# Patient Record
Sex: FEMALE | Race: WHITE | NOT HISPANIC OR LATINO | Employment: UNEMPLOYED | URBAN - NONMETROPOLITAN AREA
[De-identification: names, ages, dates, MRNs, and addresses within clinical notes are randomized per-mention and may not be internally consistent; named-entity substitution may affect disease eponyms.]

---

## 2017-03-21 ENCOUNTER — TRANSCRIBE ORDERS (OUTPATIENT)
Dept: ADMINISTRATIVE | Facility: HOSPITAL | Age: 67
End: 2017-03-21

## 2017-03-21 DIAGNOSIS — M89.8X9 BONE PAIN: ICD-10-CM

## 2017-03-21 DIAGNOSIS — Z12.31 VISIT FOR SCREENING MAMMOGRAM: ICD-10-CM

## 2017-03-21 DIAGNOSIS — Z13.820 SCREENING FOR OSTEOPOROSIS: Primary | ICD-10-CM

## 2017-03-21 DIAGNOSIS — Z85.3 HISTORY OF BREAST CANCER: ICD-10-CM

## 2017-03-28 ENCOUNTER — HOSPITAL ENCOUNTER (OUTPATIENT)
Dept: NUCLEAR MEDICINE | Facility: HOSPITAL | Age: 67
Discharge: HOME | End: 2017-03-28
Attending: INTERNAL MEDICINE

## 2017-03-28 ENCOUNTER — HOSPITAL ENCOUNTER (OUTPATIENT)
Dept: MAMMOGRAPHY | Facility: HOSPITAL | Age: 67
Discharge: HOME | End: 2017-03-28
Attending: INTERNAL MEDICINE | Admitting: INTERNAL MEDICINE

## 2017-03-28 DIAGNOSIS — M89.8X9 BONE PAIN: ICD-10-CM

## 2017-03-28 DIAGNOSIS — Z85.3 HISTORY OF BREAST CANCER: ICD-10-CM

## 2017-03-28 DIAGNOSIS — Z12.31 VISIT FOR SCREENING MAMMOGRAM: ICD-10-CM

## 2017-03-28 PROCEDURE — 78306 BONE IMAGING WHOLE BODY: CPT | Performed by: RADIOLOGY

## 2017-03-28 PROCEDURE — 0 TECHNETIUM OXIDRONATE KIT: Performed by: INTERNAL MEDICINE

## 2017-03-28 PROCEDURE — 77063 BREAST TOMOSYNTHESIS BI: CPT | Performed by: RADIOLOGY

## 2017-03-28 PROCEDURE — G0202 SCR MAMMO BI INCL CAD: HCPCS | Performed by: RADIOLOGY

## 2017-03-28 PROCEDURE — 78306 BONE IMAGING WHOLE BODY: CPT

## 2017-03-28 PROCEDURE — A9561 TC99M OXIDRONATE: HCPCS | Performed by: INTERNAL MEDICINE

## 2017-03-28 PROCEDURE — 77063 BREAST TOMOSYNTHESIS BI: CPT

## 2017-03-28 PROCEDURE — G0202 SCR MAMMO BI INCL CAD: HCPCS

## 2017-03-28 RX ADMIN — TECHNETIUM TC 99M OXIDRONATE 1 DOSE: 3.15 INJECTION, POWDER, LYOPHILIZED, FOR SOLUTION INTRAVENOUS at 10:00

## 2017-09-07 ENCOUNTER — TRANSCRIBE ORDERS (OUTPATIENT)
Dept: ADMINISTRATIVE | Facility: HOSPITAL | Age: 67
End: 2017-09-07

## 2017-09-07 DIAGNOSIS — R10.84 ABDOMINAL PAIN, GENERALIZED: Primary | ICD-10-CM

## 2017-10-16 ENCOUNTER — HOSPITAL ENCOUNTER (OUTPATIENT)
Dept: GENERAL RADIOLOGY | Facility: HOSPITAL | Age: 67
Discharge: HOME | End: 2017-10-16
Attending: INTERNAL MEDICINE

## 2017-10-16 ENCOUNTER — HOSPITAL ENCOUNTER (OUTPATIENT)
Dept: ULTRASOUND IMAGING | Facility: HOSPITAL | Age: 67
Discharge: HOME | End: 2017-10-16
Attending: INTERNAL MEDICINE | Admitting: INTERNAL MEDICINE

## 2017-10-16 ENCOUNTER — TRANSCRIBE ORDERS (OUTPATIENT)
Dept: LAB | Facility: HOSPITAL | Age: 67
End: 2017-10-16

## 2017-10-16 DIAGNOSIS — R10.84 ABDOMINAL PAIN, GENERALIZED: ICD-10-CM

## 2017-10-16 DIAGNOSIS — R06.02 SOB (SHORTNESS OF BREATH): Primary | ICD-10-CM

## 2017-10-16 DIAGNOSIS — R07.9 CHEST PAIN, UNSPECIFIED TYPE: ICD-10-CM

## 2017-10-16 DIAGNOSIS — R06.02 SOB (SHORTNESS OF BREATH): ICD-10-CM

## 2017-10-16 PROCEDURE — 76700 US EXAM ABDOM COMPLETE: CPT

## 2017-10-16 PROCEDURE — 76700 US EXAM ABDOM COMPLETE: CPT | Performed by: RADIOLOGY

## 2017-10-16 PROCEDURE — 71020 HC CHEST PA AND LATERAL: CPT

## 2017-10-16 PROCEDURE — 71020 XR CHEST PA AND LATERAL: CPT | Performed by: RADIOLOGY

## 2019-09-18 ENCOUNTER — HOSPITAL ENCOUNTER (OUTPATIENT)
Dept: MAMMOGRAPHY | Facility: HOSPITAL | Age: 69
Discharge: HOME | End: 2019-09-18
Admitting: INTERNAL MEDICINE

## 2019-09-18 DIAGNOSIS — Z12.39 SCREENING BREAST EXAMINATION: ICD-10-CM

## 2019-09-18 PROCEDURE — 77067 SCR MAMMO BI INCL CAD: CPT | Performed by: RADIOLOGY

## 2019-09-18 PROCEDURE — 77067 SCR MAMMO BI INCL CAD: CPT

## 2019-09-18 PROCEDURE — 77063 BREAST TOMOSYNTHESIS BI: CPT | Performed by: RADIOLOGY

## 2019-09-18 PROCEDURE — 77063 BREAST TOMOSYNTHESIS BI: CPT

## 2019-10-02 ENCOUNTER — HOSPITAL ENCOUNTER (OUTPATIENT)
Dept: ULTRASOUND IMAGING | Facility: HOSPITAL | Age: 69
Discharge: HOME | End: 2019-10-02
Admitting: RADIOLOGY

## 2019-10-02 DIAGNOSIS — R92.8 ABNORMAL MAMMOGRAM OF LEFT BREAST: ICD-10-CM

## 2019-10-02 PROCEDURE — 76642 ULTRASOUND BREAST LIMITED: CPT

## 2019-10-02 PROCEDURE — 76642 ULTRASOUND BREAST LIMITED: CPT | Performed by: RADIOLOGY

## 2019-10-15 ENCOUNTER — HOSPITAL ENCOUNTER (OUTPATIENT)
Dept: ULTRASOUND IMAGING | Facility: HOSPITAL | Age: 69
Discharge: HOME | End: 2019-10-15
Admitting: RADIOLOGY

## 2019-10-15 ENCOUNTER — HOSPITAL ENCOUNTER (OUTPATIENT)
Dept: MAMMOGRAPHY | Facility: HOSPITAL | Age: 69
Discharge: HOME | End: 2019-10-15

## 2019-10-15 DIAGNOSIS — R92.8 ABNORMAL MAMMOGRAM: ICD-10-CM

## 2019-10-15 PROCEDURE — 88305 TISSUE EXAM BY PATHOLOGIST: CPT | Performed by: RADIOLOGY

## 2019-10-15 PROCEDURE — 77065 DX MAMMO INCL CAD UNI: CPT | Performed by: RADIOLOGY

## 2019-10-15 PROCEDURE — A4648 IMPLANTABLE TISSUE MARKER: HCPCS

## 2019-10-15 PROCEDURE — 19083 BX BREAST 1ST LESION US IMAG: CPT | Performed by: RADIOLOGY

## 2019-10-17 LAB
LAB AP CASE REPORT: NORMAL
PATH REPORT.FINAL DX SPEC: NORMAL

## 2019-10-18 ENCOUNTER — TELEPHONE (OUTPATIENT)
Dept: MAMMOGRAPHY | Facility: HOSPITAL | Age: 69
End: 2019-10-18

## 2019-10-18 NOTE — TELEPHONE ENCOUNTER
----- Message from Marisol Camilo MD sent at 10/17/2019 11:55 AM EDT -----  PATHOLOGY:    Proliferative fibrocystic changes including epithelial hyperplasia without atypia, stromal fibrosis, apocrine metaplasia, and focal columnar cell change. Macrocalcifications are identified. No evidence of atypia or malignancy.    The pathology result is concordant with the imaging findings. Recommend 6 month follow-up diagnostic left mammogram.    The patient will be notified of the results and recommendations by our breast care nurse.

## 2020-10-27 ENCOUNTER — HOSPITAL ENCOUNTER (OUTPATIENT)
Dept: MAMMOGRAPHY | Facility: HOSPITAL | Age: 70
Discharge: HOME | End: 2020-10-27

## 2020-10-27 ENCOUNTER — HOSPITAL ENCOUNTER (OUTPATIENT)
Dept: ULTRASOUND IMAGING | Facility: HOSPITAL | Age: 70
Discharge: HOME | End: 2020-10-27

## 2020-10-27 DIAGNOSIS — R92.8 ABNORMAL MAMMOGRAM: ICD-10-CM

## 2020-10-27 PROCEDURE — G0279 TOMOSYNTHESIS, MAMMO: HCPCS | Performed by: RADIOLOGY

## 2020-10-27 PROCEDURE — 77065 DX MAMMO INCL CAD UNI: CPT

## 2020-10-27 PROCEDURE — 77065 DX MAMMO INCL CAD UNI: CPT | Performed by: RADIOLOGY

## 2020-10-27 PROCEDURE — G0279 TOMOSYNTHESIS, MAMMO: HCPCS

## 2020-10-27 PROCEDURE — 76642 ULTRASOUND BREAST LIMITED: CPT | Performed by: RADIOLOGY

## 2020-10-27 PROCEDURE — 76642 ULTRASOUND BREAST LIMITED: CPT

## 2021-01-13 ENCOUNTER — IMMUNIZATION (OUTPATIENT)
Dept: VACCINE CLINIC | Facility: HOSPITAL | Age: 71
End: 2021-01-13

## 2021-01-13 PROCEDURE — 91300 HC SARSCOV02 VAC 30MCG/0.3ML IM: CPT | Performed by: FAMILY MEDICINE

## 2021-01-13 PROCEDURE — 0001A: CPT | Performed by: FAMILY MEDICINE

## 2021-02-03 ENCOUNTER — IMMUNIZATION (OUTPATIENT)
Dept: VACCINE CLINIC | Facility: HOSPITAL | Age: 71
End: 2021-02-03

## 2021-02-03 PROCEDURE — 91300 HC SARSCOV02 VAC 30MCG/0.3ML IM: CPT | Performed by: INTERNAL MEDICINE

## 2021-02-03 PROCEDURE — 0002A: CPT | Performed by: INTERNAL MEDICINE

## 2021-05-26 ENCOUNTER — HOSPITAL ENCOUNTER (OUTPATIENT)
Dept: MAMMOGRAPHY | Facility: HOSPITAL | Age: 71
Discharge: HOME | End: 2021-05-26
Admitting: INTERNAL MEDICINE

## 2021-05-26 DIAGNOSIS — R92.8 ABNORMAL MAMMOGRAM: ICD-10-CM

## 2021-05-26 PROCEDURE — 77065 DX MAMMO INCL CAD UNI: CPT

## 2021-05-26 PROCEDURE — 77065 DX MAMMO INCL CAD UNI: CPT | Performed by: RADIOLOGY

## 2021-05-26 PROCEDURE — G0279 TOMOSYNTHESIS, MAMMO: HCPCS | Performed by: RADIOLOGY

## 2021-05-26 PROCEDURE — G0279 TOMOSYNTHESIS, MAMMO: HCPCS

## 2021-07-07 ENCOUNTER — HOSPITAL ENCOUNTER (OUTPATIENT)
Dept: GENERAL RADIOLOGY | Facility: HOSPITAL | Age: 71
Discharge: HOME | End: 2021-07-07
Admitting: NURSE PRACTITIONER

## 2021-07-07 ENCOUNTER — TRANSCRIBE ORDERS (OUTPATIENT)
Dept: ADMINISTRATIVE | Facility: HOSPITAL | Age: 71
End: 2021-07-07

## 2021-07-07 DIAGNOSIS — M54.50 LOW BACK PAIN, UNSPECIFIED BACK PAIN LATERALITY, UNSPECIFIED CHRONICITY, UNSPECIFIED WHETHER SCIATICA PRESENT: Primary | ICD-10-CM

## 2021-07-07 PROCEDURE — 72110 X-RAY EXAM L-2 SPINE 4/>VWS: CPT

## 2021-07-07 PROCEDURE — 72110 X-RAY EXAM L-2 SPINE 4/>VWS: CPT | Performed by: RADIOLOGY

## 2021-08-26 ENCOUNTER — HOSPITAL ENCOUNTER (OUTPATIENT)
Dept: GENERAL RADIOLOGY | Facility: HOSPITAL | Age: 71
Discharge: HOME | End: 2021-08-26
Admitting: NURSE PRACTITIONER

## 2021-08-26 ENCOUNTER — TRANSCRIBE ORDERS (OUTPATIENT)
Dept: ADMINISTRATIVE | Facility: HOSPITAL | Age: 71
End: 2021-08-26

## 2021-08-26 DIAGNOSIS — R07.81 RIB PAIN ON RIGHT SIDE: ICD-10-CM

## 2021-08-26 DIAGNOSIS — R07.9 CHEST PAIN, UNSPECIFIED TYPE: Primary | ICD-10-CM

## 2021-08-26 DIAGNOSIS — R07.9 CHEST PAIN, UNSPECIFIED TYPE: ICD-10-CM

## 2021-08-26 PROCEDURE — 71046 X-RAY EXAM CHEST 2 VIEWS: CPT | Performed by: RADIOLOGY

## 2021-08-26 PROCEDURE — 71046 X-RAY EXAM CHEST 2 VIEWS: CPT

## 2021-11-23 ENCOUNTER — APPOINTMENT (OUTPATIENT)
Dept: MAMMOGRAPHY | Facility: HOSPITAL | Age: 71
End: 2021-11-23

## 2022-02-24 ENCOUNTER — HOSPITAL ENCOUNTER (OUTPATIENT)
Dept: MAMMOGRAPHY | Facility: HOSPITAL | Age: 72
Discharge: HOME | End: 2022-02-24
Admitting: RADIOLOGY

## 2022-02-24 DIAGNOSIS — R92.8 ABNORMAL MAMMOGRAM: ICD-10-CM

## 2022-02-24 PROCEDURE — G0279 TOMOSYNTHESIS, MAMMO: HCPCS

## 2022-02-24 PROCEDURE — 77065 DX MAMMO INCL CAD UNI: CPT

## 2022-02-24 PROCEDURE — G0279 TOMOSYNTHESIS, MAMMO: HCPCS | Performed by: RADIOLOGY

## 2022-02-24 PROCEDURE — 77065 DX MAMMO INCL CAD UNI: CPT | Performed by: RADIOLOGY

## 2023-03-02 ENCOUNTER — HOSPITAL ENCOUNTER (OUTPATIENT)
Dept: MAMMOGRAPHY | Facility: HOSPITAL | Age: 73
Discharge: HOME | End: 2023-03-02
Admitting: INTERNAL MEDICINE
Payer: MEDICARE

## 2023-03-02 DIAGNOSIS — R92.8 ABNORMAL MAMMOGRAM: ICD-10-CM

## 2023-03-02 PROCEDURE — 77065 DX MAMMO INCL CAD UNI: CPT | Performed by: RADIOLOGY

## 2023-03-02 PROCEDURE — 77065 DX MAMMO INCL CAD UNI: CPT

## 2023-03-02 PROCEDURE — G0279 TOMOSYNTHESIS, MAMMO: HCPCS | Performed by: RADIOLOGY

## 2023-03-02 PROCEDURE — G0279 TOMOSYNTHESIS, MAMMO: HCPCS

## 2023-05-19 ENCOUNTER — TRANSCRIBE ORDERS (OUTPATIENT)
Dept: ADMINISTRATIVE | Facility: HOSPITAL | Age: 73
End: 2023-05-19
Payer: MEDICARE

## 2023-05-19 DIAGNOSIS — Z78.0 MENOPAUSE: ICD-10-CM

## 2023-05-19 DIAGNOSIS — Z85.3 PERSONAL HISTORY OF MALIGNANT NEOPLASM OF BREAST: Primary | ICD-10-CM

## 2023-05-22 ENCOUNTER — TRANSCRIBE ORDERS (OUTPATIENT)
Dept: ADMINISTRATIVE | Facility: HOSPITAL | Age: 73
End: 2023-05-22
Payer: MEDICARE

## 2023-05-22 DIAGNOSIS — Z85.3 PERSONAL HISTORY OF BREAST CANCER: Primary | ICD-10-CM

## 2023-05-22 DIAGNOSIS — Z78.0 ASYMPTOMATIC MENOPAUSAL STATE: ICD-10-CM

## 2023-06-05 ENCOUNTER — HOSPITAL ENCOUNTER (OUTPATIENT)
Dept: BONE DENSITY | Facility: HOSPITAL | Age: 73
Discharge: HOME | End: 2023-06-05
Payer: MEDICARE

## 2023-06-05 DIAGNOSIS — Z78.0 MENOPAUSE: ICD-10-CM

## 2023-06-05 DIAGNOSIS — Z85.3 PERSONAL HISTORY OF MALIGNANT NEOPLASM OF BREAST: ICD-10-CM

## 2023-06-05 PROCEDURE — 77080 DXA BONE DENSITY AXIAL: CPT

## 2023-07-06 PROBLEM — I48.91 ATRIAL FIBRILLATION WITH RVR: Status: ACTIVE | Noted: 2023-07-06

## 2023-07-11 PROBLEM — I48.0 PAROXYSMAL ATRIAL FIBRILLATION: Status: ACTIVE | Noted: 2023-07-11

## 2023-07-11 PROBLEM — I15.0 RENOVASCULAR HYPERTENSION: Status: ACTIVE | Noted: 2023-07-11

## 2023-07-11 PROBLEM — E03.9 HYPOTHYROIDISM (ACQUIRED): Status: ACTIVE | Noted: 2023-07-11

## 2023-08-18 RX ORDER — RANOLAZINE 500 MG/1
500 TABLET, EXTENDED RELEASE ORAL EVERY 12 HOURS SCHEDULED
Qty: 60 TABLET | Refills: 3 | Status: SHIPPED | OUTPATIENT
Start: 2023-08-18

## 2023-08-18 NOTE — TELEPHONE ENCOUNTER
Caller: Cisco Luna HARRY    Relationship: Self    Best call back number: [phone]    Requested Prescriptions:   Requested Prescriptions     Pending Prescriptions Disp Refills    ranolazine (RANEXA) 500 MG 12 hr tablet 60 tablet 0     Sig: Take 1 tablet by mouth Every 12 (Twelve) Hours for 30 days.        Pharmacy where request should be sent: [place], [place] - [address] [facility] - [phone]  - [phone]     Last office visit with prescribing clinician: 7/11/2023   Last telemedicine visit with prescribing clinician: Visit date not found   Next office visit with prescribing clinician: 11/9/2023       Does the patient have less than a 3 day supply:  [] Yes  [x] No    Would you like a call back once the refill request has been completed: [] Yes [x] No    If the office needs to give you a call back, can they leave a voicemail: [] Yes [x] No    Bunny Garcia Rep   08/18/23 09:55 EDT

## 2023-11-09 ENCOUNTER — OFFICE VISIT (OUTPATIENT)
Dept: CARDIOLOGY | Facility: CLINIC | Age: 73
End: 2023-11-09
Payer: MEDICARE

## 2023-11-09 VITALS
SYSTOLIC BLOOD PRESSURE: 178 MMHG | OXYGEN SATURATION: 98 % | BODY MASS INDEX: 27.64 KG/M2 | WEIGHT: 172 LBS | HEIGHT: 66 IN | HEART RATE: 44 BPM | DIASTOLIC BLOOD PRESSURE: 82 MMHG

## 2023-11-09 DIAGNOSIS — I48.0 PAROXYSMAL ATRIAL FIBRILLATION: Primary | ICD-10-CM

## 2023-11-09 DIAGNOSIS — E03.9 HYPOTHYROIDISM (ACQUIRED): ICD-10-CM

## 2023-11-09 DIAGNOSIS — I15.0 RENOVASCULAR HYPERTENSION: ICD-10-CM

## 2023-11-09 PROCEDURE — 99212 OFFICE O/P EST SF 10 MIN: CPT | Performed by: INTERNAL MEDICINE

## 2023-11-09 PROCEDURE — 93000 ELECTROCARDIOGRAM COMPLETE: CPT | Performed by: INTERNAL MEDICINE

## 2023-11-09 RX ORDER — HYDROCHLOROTHIAZIDE 25 MG/1
25 TABLET ORAL DAILY
Qty: 30 TABLET | Refills: 11 | Status: SHIPPED | OUTPATIENT
Start: 2023-11-09

## 2023-11-09 NOTE — PROGRESS NOTES
Office Note    Subjective     Luna Peck is a 73 y.o. female who presents to day for follow-up      Active Problems:  Problem List Items Addressed This Visit          Cardiac and Vasculature    Paroxysmal atrial fibrillation - Primary    Renovascular hypertension    Relevant Medications    hydroCHLOROthiazide (HYDRODIURIL) 25 MG tablet       Endocrine and Metabolic    Hypothyroidism (acquired)       HPI  Patient is a pleasant 72-year-old female past medical history significant for hypertension, hypothyroidism, gastroesophageal reflux disease came in with complaints with A-fib with fast ventricular sponsor on July 6.  Patient had ischemic evaluation done with a stress test done on July 8, 2023 showed normal EF and negative for ischemia and echo which were normal.  Patient is currently in sinus rhythm.  Patient is on anticoagulation also.     Patient blood pressure is high today.  She had not taken her blood pressure medication today.  Has had no chest pains or breathing problems.  Patient has some mild swelling in the left leg and with some tingling sensation at times.    PRIOR MEDS  Current Outpatient Medications on File Prior to Visit   Medication Sig Dispense Refill    alendronate (FOSAMAX) 70 MG tablet Take 1 tablet by mouth Every 7 (Seven) Days.      bumetanide (BUMEX) 1 MG tablet Take 1 tablet by mouth Daily As Needed (swelling).      cetirizine (zyrTEC) 10 MG tablet Take 1 tablet by mouth Every Night.      Diclofenac Sodium (VOLTAREN) 1 % gel gel Apply 2 g topically to the appropriate area as directed 4 (Four) Times a Day As Needed.      HYDROcodone-acetaminophen (NORCO) 7.5-325 MG per tablet Take 1 tablet by mouth Daily As Needed for Moderate Pain.      levothyroxine (SYNTHROID, LEVOTHROID) 137 MCG tablet Take 1 tablet by mouth Daily.      omeprazole (priLOSEC) 20 MG capsule Take 1 capsule by mouth 2 (Two) Times a Day.      potassium chloride 10 MEQ CR tablet Take 1 tablet by mouth Daily.       ranolazine (RANEXA) 500 MG 12 hr tablet Take 1 tablet by mouth Every 12 (Twelve) Hours. 60 tablet 3    vitamin D (ERGOCALCIFEROL) 1.25 MG (30301 UT) capsule capsule Take 1 capsule by mouth 1 (One) Time Per Week. Takes Sundays      [DISCONTINUED] dilTIAZem CD (CARDIZEM CD) 120 MG 24 hr capsule Take 1 capsule by mouth Daily. 90 capsule 3     No current facility-administered medications on file prior to visit.       ALLERGIES  Patient has no known allergies.    HISTORY  Past Medical History:   Diagnosis Date    Arthritis     Breast cancer 2006    right    Disease of thyroid gland     GERD (gastroesophageal reflux disease)     Hypertension     Injury of back     Migraine     Osteoporosis        Social History     Socioeconomic History    Marital status:    Tobacco Use    Smoking status: Never     Passive exposure: Past    Smokeless tobacco: Never   Vaping Use    Vaping Use: Never used   Substance and Sexual Activity    Alcohol use: Yes     Alcohol/week: 3.0 standard drinks of alcohol     Types: 3 Shots of liquor per week    Drug use: Never    Sexual activity: Defer       Family History   Problem Relation Age of Onset    Breast cancer Neg Hx        Review of Systems   Constitutional:  Negative for chills, diaphoresis and fatigue.   HENT:  Negative for ear discharge and tinnitus.    Eyes:  Negative for redness and visual disturbance.   Respiratory: Negative.  Negative for apnea, cough, choking, chest tightness, shortness of breath, wheezing and stridor.    Cardiovascular: Negative.  Negative for palpitations and leg swelling.   Gastrointestinal: Negative.  Negative for abdominal distention, abdominal pain, constipation, diarrhea, nausea and vomiting.   Endocrine: Negative for cold intolerance, heat intolerance, polydipsia, polyphagia and polyuria.   Genitourinary:  Negative for difficulty urinating, flank pain, frequency, hematuria and urgency.   Musculoskeletal: Negative.  Negative for arthralgias, back pain,  "gait problem, joint swelling, myalgias and neck pain.   Skin:  Negative for pallor and rash.   Neurological:  Positive for numbness. Negative for dizziness, speech difficulty, light-headedness and headaches.   Hematological:  Does not bruise/bleed easily.   Psychiatric/Behavioral: Negative.  Negative for agitation, hallucinations, self-injury, sleep disturbance and suicidal ideas. The patient is not nervous/anxious.        Objective     VITALS: /82 (BP Location: Left arm, Patient Position: Sitting, Cuff Size: Adult)   Pulse (!) 44   Ht 167.6 cm (66\")   Wt 78 kg (172 lb)   SpO2 98%   BMI 27.76 kg/m²     LABS:   No visits with results within 3 Month(s) from this visit.   Latest known visit with results is:   No results displayed because visit has over 200 results.            IMAGING:   No Images in the past 120 days found..  Results for orders placed during the hospital encounter of 07/06/23    Stress Test With Myocardial Perfusion One Day    Interpretation Summary    Left ventricular ejection fraction is normal.    Myocardial perfusion imaging indicates a normal myocardial perfusion study with no evidence of ischemia.    Impressions are consistent with a low risk study.    TID 0.91.    Findings consistent with a normal ECG stress test.     No results found for this or any previous visit.          EXAM:  Constitutional:       General: Awake.      Appearance: Healthy appearance. Not in distress.   Eyes:      Conjunctiva/sclera: Conjunctivae normal.   HENT:      Head: Normocephalic and atraumatic.      Nose: Nose normal.    Mouth/Throat:      Pharynx: Oropharynx is clear.   Neck:      Thyroid: Thyroid normal.      Vascular: No carotid bruit or JVD.   Pulmonary:      Effort: Pulmonary effort is normal.      Breath sounds: Normal breath sounds.   Chest:      Chest wall: Not tender to palpatation.   Cardiovascular:      PMI at left midclavicular line. Normal rate. Regular rhythm. Normal S1 with normal intensity. " Normal S2 with normal intensity.       Murmurs: There is no murmur.      No gallop.  No rub.   Pulses:     Intact distal pulses.   Edema:     Peripheral edema absent.   Abdominal:      General: Bowel sounds are normal. There is no distension.      Palpations: Abdomen is soft. There is no hepatomegaly.      Tenderness: There is no abdominal tenderness.   Musculoskeletal: Normal range of motion.      Cervical back: Normal range of motion. Skin:     General: Skin is warm and dry. There is no cyanosis.      Coloration: Skin is not jaundiced.   Neurological:      Mental Status: Alert and oriented to person, place and time.      Motor: Motor function is intact.      Gait: Gait is intact.   Psychiatric:         Attention and Perception: Attention and perception normal.         Speech: Speech normal.         Behavior: Behavior is cooperative.         Cognition and Memory: Cognition and memory normal.         Judgment: Judgment normal.          Procedure     ECG 12 Lead    Date/Time: 11/9/2023 2:48 PM  Performed by: Nahum Deutsch MD    Authorized by: Nahum Deutsch MD  Comparison: compared with previous ECG from 7/8/2023  Similar to previous ECG  Comments: Okay yeah I think I was not here when he left excuse me is a little difficult EKG showed sinus rhythm at 44, left axis, right bundle branch block             Assessment & Plan     Diagnoses and all orders for this visit:    1. Paroxysmal atrial fibrillation (Primary)    2. Renovascular hypertension    3. Hypothyroidism (acquired)    Other orders  -     ECG 12 Lead  -     hydroCHLOROthiazide (HYDRODIURIL) 25 MG tablet; Take 1 tablet by mouth Daily.  Dispense: 30 tablet; Refill: 11          PLAN  #1 cardiac.  Patient with history of proximal atrial fibrillation.  Patient heart rate is noted to be in 40s.  Will get EKG done.  We will stop her Cardizem.  May put 7-day event monitor.  2. Hypertension.  Patient blood pressure is running on the high side.  She has not taken her  medication today.  Will start her on hydrochlorothiazide.  May have to add angiotensin receptor blocker.  Systolic pressure less than 139 diastolic less than 89 will be advised.  We will have her check her blood pressure daily for a week and call us back           MEDS ORDERED DURING VISIT:    Medications Discontinued During This Encounter   Medication Reason    dilTIAZem CD (CARDIZEM CD) 120 MG 24 hr capsule *Therapy completed        New Medications Ordered This Visit   Medications    hydroCHLOROthiazide (HYDRODIURIL) 25 MG tablet     Sig: Take 1 tablet by mouth Daily.     Dispense:  30 tablet     Refill:  11         Follow Up:   Return in about 4 weeks (around 12/7/2023) for Recheck.    Patient was given instructions and counseling regarding her condition or for health maintenance advice. Please see specific information pulled into the AVS if appropriate.   As always, Tamara Ramirez APRN  I appreciate very much the opportunity to participate in the cardiovascular care of your patients. Please do not hesitate to call me with any questions with regards to Luna Peck evaluation and management.         This document has been electronically signed by Nahum Deutsch MD [facility], Interventional Cardiology  November 9, 2023 14:51 EST    Dictated Utilizing Dragon Dictation: Part of this note may be an electronic transcription/translation of spoken language to printed text using the Dragon Dictation System.

## 2023-12-07 ENCOUNTER — OFFICE VISIT (OUTPATIENT)
Dept: CARDIOLOGY | Facility: CLINIC | Age: 73
End: 2023-12-07
Payer: MEDICARE

## 2023-12-07 VITALS
WEIGHT: 166.6 LBS | HEART RATE: 64 BPM | HEIGHT: 66 IN | BODY MASS INDEX: 26.78 KG/M2 | SYSTOLIC BLOOD PRESSURE: 124 MMHG | OXYGEN SATURATION: 96 % | DIASTOLIC BLOOD PRESSURE: 75 MMHG

## 2023-12-07 DIAGNOSIS — E03.9 HYPOTHYROIDISM (ACQUIRED): ICD-10-CM

## 2023-12-07 DIAGNOSIS — I48.0 PAROXYSMAL ATRIAL FIBRILLATION: Primary | ICD-10-CM

## 2023-12-07 DIAGNOSIS — I15.0 RENOVASCULAR HYPERTENSION: ICD-10-CM

## 2023-12-07 PROCEDURE — 1159F MED LIST DOCD IN RCRD: CPT | Performed by: INTERNAL MEDICINE

## 2023-12-07 PROCEDURE — 1160F RVW MEDS BY RX/DR IN RCRD: CPT | Performed by: INTERNAL MEDICINE

## 2023-12-07 PROCEDURE — 99212 OFFICE O/P EST SF 10 MIN: CPT | Performed by: INTERNAL MEDICINE

## 2023-12-07 NOTE — PROGRESS NOTES
Office Note    Subjective     Luna Peck is a 73 y.o. female who presents to day for routine follow-up      Active Problems:  Problem List Items Addressed This Visit          Cardiac and Vasculature    Paroxysmal atrial fibrillation - Primary    Renovascular hypertension       Endocrine and Metabolic    Hypothyroidism (acquired)       HPI  Patient is a pleasant 72-year-old female past medical history significant for hypertension, hypothyroidism, gastroesophageal reflux disease came in with complaints with A-fib with fast ventricular sponsor on July 6.  Patient had ischemic evaluation done with a stress and echo which were normal.     Patient had event monitor placed in November 2023.  On evaluation of the event monitor heart rate was noted to be as low as 38-45 range while she was sleeping.  No heart blocks noted.    She has had mild lightheadedness but no blackouts.  Denies any chest pains.    PRIOR MEDS  Current Outpatient Medications on File Prior to Visit   Medication Sig Dispense Refill    alendronate (FOSAMAX) 70 MG tablet Take 1 tablet by mouth Every 7 (Seven) Days.      bumetanide (BUMEX) 1 MG tablet Take 1 tablet by mouth Daily As Needed (swelling).      cetirizine (zyrTEC) 10 MG tablet Take 1 tablet by mouth Every Night.      Diclofenac Sodium (VOLTAREN) 1 % gel gel Apply 2 g topically to the appropriate area as directed 4 (Four) Times a Day As Needed.      hydroCHLOROthiazide (HYDRODIURIL) 25 MG tablet Take 1 tablet by mouth Daily. 30 tablet 11    HYDROcodone-acetaminophen (NORCO) 7.5-325 MG per tablet Take 1 tablet by mouth Daily As Needed for Moderate Pain.      levothyroxine (SYNTHROID, LEVOTHROID) 137 MCG tablet Take 1 tablet by mouth Daily.      omeprazole (priLOSEC) 20 MG capsule Take 1 capsule by mouth 2 (Two) Times a Day.      potassium chloride 10 MEQ CR tablet Take 1 tablet by mouth Daily.      vitamin D (ERGOCALCIFEROL) 1.25 MG (67944 UT) capsule capsule Take 1 capsule by mouth 1 (One)  "Time Per Week. Takes Sundays      [DISCONTINUED] ranolazine (RANEXA) 500 MG 12 hr tablet Take 1 tablet by mouth Every 12 (Twelve) Hours. 60 tablet 3     No current facility-administered medications on file prior to visit.       ALLERGIES  Patient has no known allergies.    HISTORY  Past Medical History:   Diagnosis Date    Arthritis     Breast cancer 2006    right    Disease of thyroid gland     GERD (gastroesophageal reflux disease)     Hypertension     Injury of back     Migraine     Osteoporosis        Social History     Socioeconomic History    Marital status:    Tobacco Use    Smoking status: Never     Passive exposure: Past    Smokeless tobacco: Never   Vaping Use    Vaping Use: Never used   Substance and Sexual Activity    Alcohol use: Yes     Alcohol/week: 3.0 standard drinks of alcohol     Types: 3 Shots of liquor per week    Drug use: Never    Sexual activity: Defer       Family History   Problem Relation Age of Onset    Breast cancer Neg Hx        Review of Systems   Respiratory:  Negative for chest tightness, shortness of breath and wheezing.    Cardiovascular:  Negative for chest pain and palpitations.   Neurological:  Positive for light-headedness. Negative for syncope.       Objective     VITALS: /75 (BP Location: Left arm, Patient Position: Sitting, Cuff Size: Adult)   Pulse 64   Ht 167.6 cm (66\")   Wt 75.6 kg (166 lb 9.6 oz)   SpO2 96%   BMI 26.89 kg/m²     LABS:   No visits with results within 3 Month(s) from this visit.   Latest known visit with results is:   No results displayed because visit has over 200 results.            IMAGING:   No Images in the past 120 days found..  Results for orders placed during the hospital encounter of 07/06/23    Stress Test With Myocardial Perfusion One Day    Interpretation Summary    Left ventricular ejection fraction is normal.    Myocardial perfusion imaging indicates a normal myocardial perfusion study with no evidence of ischemia.    " Impressions are consistent with a low risk study.    TID 0.91.    Findings consistent with a normal ECG stress test.     No results found for this or any previous visit.          EXAM:  Constitutional:       General: Awake.      Appearance: Healthy appearance. Not in distress.   Eyes:      Conjunctiva/sclera: Conjunctivae normal.   HENT:      Head: Normocephalic and atraumatic.      Nose: Nose normal.    Mouth/Throat:      Pharynx: Oropharynx is clear.   Neck:      Thyroid: Thyroid normal.      Vascular: No carotid bruit or JVD.   Pulmonary:      Effort: Pulmonary effort is normal.      Breath sounds: Normal breath sounds.   Chest:      Chest wall: Not tender to palpatation.   Cardiovascular:      PMI at left midclavicular line. Normal rate. Regular rhythm. Normal S1 with normal intensity. Normal S2 with normal intensity.       Murmurs: There is no murmur.      No gallop.  No rub.   Pulses:     Intact distal pulses.   Edema:     Peripheral edema absent.   Abdominal:      General: Bowel sounds are normal. There is no distension.      Palpations: Abdomen is soft. There is no hepatomegaly.      Tenderness: There is no abdominal tenderness.   Musculoskeletal: Normal range of motion.      Cervical back: Normal range of motion. Skin:     General: Skin is warm and dry. There is no cyanosis.      Coloration: Skin is not jaundiced.   Neurological:      Mental Status: Alert and oriented to person, place and time.      Motor: Motor function is intact.      Gait: Gait is intact.   Psychiatric:         Attention and Perception: Attention and perception normal.         Speech: Speech normal.         Behavior: Behavior is cooperative.         Cognition and Memory: Cognition and memory normal.         Judgment: Judgment normal.          Procedure   Procedures       Assessment & Plan     Diagnoses and all orders for this visit:    1. Paroxysmal atrial fibrillation (Primary)    2. Renovascular hypertension    3. Hypothyroidism  (acquired)    Other orders  -     apixaban (ELIQUIS) 5 MG tablet tablet; Take 1 tablet by mouth 2 (Two) Times a Day.  Dispense: 60 tablet; Refill: 12          PLAN  #1 cardiac.  Patient with history of paroxysmal atrial fibrillation.  On last visit in November her Cardizem was DC'd.  She mistakenly stopped her Eliquis also.  Will restart Eliquis 5 mg twice daily.  Patient wore event monitor which did not show any significant heart blocks although she did have heart rate slowed down and high 30s to low 40s while sleeping.  Patient has any symptoms of dizziness lightheadedness or blackouts, may have to consider putting a permanent pacemaker also.  Will watch clinically.  Will stop Ranexa due to potential bradycardia effect of same.           MEDS ORDERED DURING VISIT:    Medications Discontinued During This Encounter   Medication Reason    ranolazine (RANEXA) 500 MG 12 hr tablet *Therapy completed        New Medications Ordered This Visit   Medications    apixaban (ELIQUIS) 5 MG tablet tablet     Sig: Take 1 tablet by mouth 2 (Two) Times a Day.     Dispense:  60 tablet     Refill:  12         Follow Up:   Return in about 6 months (around 6/7/2024) for Recheck.    Patient was given instructions and counseling regarding her condition or for health maintenance advice. Please see specific information pulled into the AVS if appropriate.   As always, Tamara Ramirez APRN  I appreciate very much the opportunity to participate in the cardiovascular care of your patients. Please do not hesitate to call me with any questions with regards to Luna Peck evaluation and management.         This document has been electronically signed by Nahum Deutsch MD [facility], Interventional Cardiology  December 7, 2023 14:01 EST    Dictated Utilizing Dragon Dictation: Part of this note may be an electronic transcription/translation of spoken language to printed text using the Dragon Dictation System.

## 2024-01-17 ENCOUNTER — TELEPHONE (OUTPATIENT)
Dept: CARDIOLOGY | Facility: CLINIC | Age: 74
End: 2024-01-17
Payer: MEDICARE

## 2024-01-17 NOTE — TELEPHONE ENCOUNTER
Caller: Luna Peck    Relationship: Self    Best call back number: [phone]    Which medication are you concerned about: RANOLAZINE 500 MG ONE TABLET  2 TIMES DAILY    Who prescribed you this medication: DR. BENITO    When did you start taking this medication: FOR AWHILE    What are your concerns: PATIENT NEEDS A REFILL FOR RANOLAZINE 500 MG 1 TABLET 2 TIMES A DAY    PLEASE SEND PRESCRIPTION TO [facility] [place] [phone]    PATIENT IS OUT OF MEDICATION

## 2024-01-17 NOTE — TELEPHONE ENCOUNTER
Contacted patient as this medication was discontinued in August of 2023. Will contact her pharmacy as well

## 2024-03-02 PROCEDURE — 99284 EMERGENCY DEPT VISIT MOD MDM: CPT

## 2024-03-02 PROCEDURE — 93005 ELECTROCARDIOGRAM TRACING: CPT | Performed by: EMERGENCY MEDICINE

## 2024-03-03 ENCOUNTER — HOSPITAL ENCOUNTER (EMERGENCY)
Facility: HOSPITAL | Age: 74
Discharge: HOME | End: 2024-03-03
Attending: EMERGENCY MEDICINE
Payer: MEDICARE

## 2024-03-03 ENCOUNTER — APPOINTMENT (OUTPATIENT)
Dept: GENERAL RADIOLOGY | Facility: HOSPITAL | Age: 74
End: 2024-03-03
Payer: MEDICARE

## 2024-03-03 VITALS
TEMPERATURE: 99.1 F | HEART RATE: 71 BPM | WEIGHT: 150 LBS | RESPIRATION RATE: 18 BRPM | BODY MASS INDEX: 24.11 KG/M2 | DIASTOLIC BLOOD PRESSURE: 70 MMHG | SYSTOLIC BLOOD PRESSURE: 121 MMHG | HEIGHT: 66 IN | OXYGEN SATURATION: 94 %

## 2024-03-03 DIAGNOSIS — J10.1 INFLUENZA A: Primary | ICD-10-CM

## 2024-03-03 DIAGNOSIS — E87.6 HYPOKALEMIA: ICD-10-CM

## 2024-03-03 LAB
ALBUMIN SERPL-MCNC: 3.7 G/DL (ref 3.5–5.2)
ALBUMIN SERPL-MCNC: 4 G/DL (ref 3.5–5.2)
ALBUMIN/GLOB SERPL: 1.1 G/DL
ALBUMIN/GLOB SERPL: 1.1 G/DL
ALP SERPL-CCNC: 57 U/L (ref 39–117)
ALP SERPL-CCNC: 63 U/L (ref 39–117)
ALT SERPL W P-5'-P-CCNC: 15 U/L (ref 1–33)
ALT SERPL W P-5'-P-CCNC: 16 U/L (ref 1–33)
ANION GAP SERPL CALCULATED.3IONS-SCNC: 18.3 MMOL/L (ref 5–15)
ANION GAP SERPL CALCULATED.3IONS-SCNC: 19.3 MMOL/L (ref 5–15)
AST SERPL-CCNC: 29 U/L (ref 1–32)
AST SERPL-CCNC: 29 U/L (ref 1–32)
BASOPHILS # BLD AUTO: 0.02 10*3/MM3 (ref 0–0.2)
BASOPHILS NFR BLD AUTO: 0.1 % (ref 0–1.5)
BILIRUB SERPL-MCNC: 0.7 MG/DL (ref 0–1.2)
BILIRUB SERPL-MCNC: 0.7 MG/DL (ref 0–1.2)
BILIRUB UR QL STRIP: ABNORMAL
BUN SERPL-MCNC: 27 MG/DL (ref 8–23)
BUN SERPL-MCNC: 28 MG/DL (ref 8–23)
BUN/CREAT SERPL: 27.6 (ref 7–25)
BUN/CREAT SERPL: 28.6 (ref 7–25)
CALCIUM SPEC-SCNC: 9.1 MG/DL (ref 8.6–10.5)
CALCIUM SPEC-SCNC: 9.6 MG/DL (ref 8.6–10.5)
CHLORIDE SERPL-SCNC: 101 MMOL/L (ref 98–107)
CHLORIDE SERPL-SCNC: 99 MMOL/L (ref 98–107)
CLARITY UR: CLEAR
CO2 SERPL-SCNC: 21.7 MMOL/L (ref 22–29)
CO2 SERPL-SCNC: 22.7 MMOL/L (ref 22–29)
COLOR UR: ABNORMAL
CREAT SERPL-MCNC: 0.98 MG/DL (ref 0.57–1)
CREAT SERPL-MCNC: 0.98 MG/DL (ref 0.57–1)
CRP SERPL-MCNC: 5.38 MG/DL (ref 0–0.5)
DEPRECATED RDW RBC AUTO: 38.4 FL (ref 37–54)
EGFRCR SERPLBLD CKD-EPI 2021: 60.7 ML/MIN/1.73
EGFRCR SERPLBLD CKD-EPI 2021: 60.7 ML/MIN/1.73
EOSINOPHIL # BLD AUTO: 0 10*3/MM3 (ref 0–0.4)
EOSINOPHIL NFR BLD AUTO: 0 % (ref 0.3–6.2)
ERYTHROCYTE [DISTWIDTH] IN BLOOD BY AUTOMATED COUNT: 12.5 % (ref 12.3–15.4)
ERYTHROCYTE [SEDIMENTATION RATE] IN BLOOD: 30 MM/HR (ref 0–30)
FLUAV RNA RESP QL NAA+PROBE: DETECTED
FLUBV RNA ISLT QL NAA+PROBE: NOT DETECTED
GEN 5 2HR TROPONIN T REFLEX: 35 NG/L
GLOBULIN UR ELPH-MCNC: 3.4 GM/DL
GLOBULIN UR ELPH-MCNC: 3.7 GM/DL
GLUCOSE SERPL-MCNC: 109 MG/DL (ref 65–99)
GLUCOSE SERPL-MCNC: 122 MG/DL (ref 65–99)
GLUCOSE UR STRIP-MCNC: NEGATIVE MG/DL
HCT VFR BLD AUTO: 43 % (ref 34–46.6)
HGB BLD-MCNC: 14.6 G/DL (ref 12–15.9)
HGB UR QL STRIP.AUTO: NEGATIVE
IMM GRANULOCYTES # BLD AUTO: 0.04 10*3/MM3 (ref 0–0.05)
IMM GRANULOCYTES NFR BLD AUTO: 0.3 % (ref 0–0.5)
KETONES UR QL STRIP: ABNORMAL
LEUKOCYTE ESTERASE UR QL STRIP.AUTO: NEGATIVE
LYMPHOCYTES # BLD AUTO: 1.52 10*3/MM3 (ref 0.7–3.1)
LYMPHOCYTES NFR BLD AUTO: 10.5 % (ref 19.6–45.3)
MAGNESIUM SERPL-MCNC: 1.8 MG/DL (ref 1.6–2.4)
MCH RBC QN AUTO: 28.6 PG (ref 26.6–33)
MCHC RBC AUTO-ENTMCNC: 34 G/DL (ref 31.5–35.7)
MCV RBC AUTO: 84.3 FL (ref 79–97)
MONOCYTES # BLD AUTO: 1.04 10*3/MM3 (ref 0.1–0.9)
MONOCYTES NFR BLD AUTO: 7.2 % (ref 5–12)
NEUTROPHILS NFR BLD AUTO: 11.8 10*3/MM3 (ref 1.7–7)
NEUTROPHILS NFR BLD AUTO: 81.9 % (ref 42.7–76)
NITRITE UR QL STRIP: NEGATIVE
NRBC BLD AUTO-RTO: 0 /100 WBC (ref 0–0.2)
NT-PROBNP SERPL-MCNC: 1067 PG/ML (ref 0–900)
PH UR STRIP.AUTO: 6 [PH] (ref 5–8)
PLATELET # BLD AUTO: 280 10*3/MM3 (ref 140–450)
PMV BLD AUTO: 9.9 FL (ref 6–12)
POTASSIUM SERPL-SCNC: 2.8 MMOL/L (ref 3.5–5.2)
POTASSIUM SERPL-SCNC: 2.9 MMOL/L (ref 3.5–5.2)
PROT SERPL-MCNC: 7.1 G/DL (ref 6–8.5)
PROT SERPL-MCNC: 7.7 G/DL (ref 6–8.5)
PROT UR QL STRIP: ABNORMAL
QT INTERVAL: 404 MS
QTC INTERVAL: 486 MS
RBC # BLD AUTO: 5.1 10*6/MM3 (ref 3.77–5.28)
SARS-COV-2 RNA RESP QL NAA+PROBE: NOT DETECTED
SODIUM SERPL-SCNC: 141 MMOL/L (ref 136–145)
SODIUM SERPL-SCNC: 141 MMOL/L (ref 136–145)
SP GR UR STRIP: 1.02 (ref 1–1.03)
T4 FREE SERPL-MCNC: 1.15 NG/DL (ref 0.93–1.7)
TROPONIN T DELTA: -1 NG/L
TROPONIN T SERPL HS-MCNC: 36 NG/L
TSH SERPL DL<=0.05 MIU/L-ACNC: 2.03 UIU/ML (ref 0.27–4.2)
UROBILINOGEN UR QL STRIP: ABNORMAL
WBC NRBC COR # BLD AUTO: 14.42 10*3/MM3 (ref 3.4–10.8)

## 2024-03-03 PROCEDURE — 84443 ASSAY THYROID STIM HORMONE: CPT | Performed by: EMERGENCY MEDICINE

## 2024-03-03 PROCEDURE — 86140 C-REACTIVE PROTEIN: CPT | Performed by: EMERGENCY MEDICINE

## 2024-03-03 PROCEDURE — 84484 ASSAY OF TROPONIN QUANT: CPT | Performed by: EMERGENCY MEDICINE

## 2024-03-03 PROCEDURE — 80053 COMPREHEN METABOLIC PANEL: CPT | Performed by: PHYSICIAN ASSISTANT

## 2024-03-03 PROCEDURE — 25810000003 SODIUM CHLORIDE 0.9 % SOLUTION: Performed by: PHYSICIAN ASSISTANT

## 2024-03-03 PROCEDURE — 87636 SARSCOV2 & INF A&B AMP PRB: CPT | Performed by: EMERGENCY MEDICINE

## 2024-03-03 PROCEDURE — 93010 ELECTROCARDIOGRAM REPORT: CPT | Performed by: INTERNAL MEDICINE

## 2024-03-03 PROCEDURE — 71045 X-RAY EXAM CHEST 1 VIEW: CPT | Performed by: RADIOLOGY

## 2024-03-03 PROCEDURE — 71045 X-RAY EXAM CHEST 1 VIEW: CPT

## 2024-03-03 PROCEDURE — 83880 ASSAY OF NATRIURETIC PEPTIDE: CPT | Performed by: EMERGENCY MEDICINE

## 2024-03-03 PROCEDURE — 85652 RBC SED RATE AUTOMATED: CPT | Performed by: EMERGENCY MEDICINE

## 2024-03-03 PROCEDURE — 84439 ASSAY OF FREE THYROXINE: CPT | Performed by: EMERGENCY MEDICINE

## 2024-03-03 PROCEDURE — 80053 COMPREHEN METABOLIC PANEL: CPT | Performed by: EMERGENCY MEDICINE

## 2024-03-03 PROCEDURE — 36415 COLL VENOUS BLD VENIPUNCTURE: CPT

## 2024-03-03 PROCEDURE — 83735 ASSAY OF MAGNESIUM: CPT | Performed by: EMERGENCY MEDICINE

## 2024-03-03 PROCEDURE — 81003 URINALYSIS AUTO W/O SCOPE: CPT | Performed by: EMERGENCY MEDICINE

## 2024-03-03 PROCEDURE — 85025 COMPLETE CBC W/AUTO DIFF WBC: CPT | Performed by: EMERGENCY MEDICINE

## 2024-03-03 RX ORDER — POTASSIUM CHLORIDE 20 MEQ/1
40 TABLET, EXTENDED RELEASE ORAL ONCE
Status: COMPLETED | OUTPATIENT
Start: 2024-03-03 | End: 2024-03-03

## 2024-03-03 RX ORDER — POTASSIUM CHLORIDE 20 MEQ/1
40 TABLET, EXTENDED RELEASE ORAL DAILY
Qty: 14 TABLET | Refills: 0 | Status: SHIPPED | OUTPATIENT
Start: 2024-03-03 | End: 2024-03-10

## 2024-03-03 RX ORDER — DEXAMETHASONE 6 MG/1
6 TABLET ORAL
Qty: 7 TABLET | Refills: 0 | Status: SHIPPED | OUTPATIENT
Start: 2024-03-03

## 2024-03-03 RX ORDER — SODIUM CHLORIDE 0.9 % (FLUSH) 0.9 %
10 SYRINGE (ML) INJECTION AS NEEDED
Status: DISCONTINUED | OUTPATIENT
Start: 2024-03-03 | End: 2024-03-03 | Stop reason: HOSPADM

## 2024-03-03 RX ADMIN — SODIUM CHLORIDE 1000 ML: 9 INJECTION, SOLUTION INTRAVENOUS at 05:17

## 2024-03-03 RX ADMIN — POTASSIUM CHLORIDE 40 MEQ: 1500 TABLET, EXTENDED RELEASE ORAL at 01:35

## 2024-03-03 RX ADMIN — POTASSIUM CHLORIDE 40 MEQ: 1500 TABLET, EXTENDED RELEASE ORAL at 08:00

## 2024-03-03 RX ADMIN — SODIUM CHLORIDE 1000 ML: 9 INJECTION, SOLUTION INTRAVENOUS at 01:34

## 2024-03-03 NOTE — ED TRIAGE NOTES
MEDICAL SCREENING:    Reason for Visit: Weakness    Patient initially seen in triage.  The patient was advised further evaluation and diagnostic testing will be needed, some of the treatment and testing will be initiated in the lobby in order to begin the process.  The patient will be returned to the waiting area for the time being and possibly be re-assessed by a subsequent ED provider.  The patient will be brought back to the treatment area in as timely manner as possible.

## 2024-03-25 NOTE — ED PROVIDER NOTES
Subjective   History of Present Illness  73-year-old female presents to the ER chief complaint of weakness.  Patient also verbalizes low-grade fever body aches as well.  Past medical history is positive for arthritis breast cancer hypertension osteoporosis.        Review of Systems   Constitutional:  Positive for fever.   HENT:  Positive for congestion.    Respiratory: Negative.     Cardiovascular: Negative.  Negative for chest pain.   Gastrointestinal: Negative.  Negative for abdominal pain.   Endocrine: Negative.    Genitourinary: Negative.  Negative for dysuria.   Musculoskeletal:  Positive for myalgias.   Skin: Negative.    Neurological:  Positive for weakness.   Psychiatric/Behavioral: Negative.     All other systems reviewed and are negative.      Past Medical History:   Diagnosis Date    Arthritis     Breast cancer 2006    right    Disease of thyroid gland     GERD (gastroesophageal reflux disease)     Hypertension     Injury of back     Migraine     Osteoporosis        No Known Allergies    Past Surgical History:   Procedure Laterality Date    BREAST BIOPSY Left 10/2019    benign    MASTECTOMY Right 2006       Family History   Problem Relation Age of Onset    Breast cancer Neg Hx        Social History     Socioeconomic History    Marital status:    Tobacco Use    Smoking status: Never     Passive exposure: Past    Smokeless tobacco: Never   Vaping Use    Vaping status: Never Used   Substance and Sexual Activity    Alcohol use: Yes     Alcohol/week: 3.0 standard drinks of alcohol     Types: 3 Shots of liquor per week    Drug use: Never    Sexual activity: Defer           Objective   Physical Exam  Vitals and nursing note reviewed.   Constitutional:       General: She is not in acute distress.     Appearance: She is well-developed. She is not diaphoretic.   HENT:      Head: Normocephalic and atraumatic.      Right Ear: External ear normal.      Left Ear: External ear normal.      Nose: Nose normal.    Eyes:      Conjunctiva/sclera: Conjunctivae normal.   Neck:      Vascular: No JVD.      Trachea: No tracheal deviation.   Cardiovascular:      Rate and Rhythm: Normal rate and regular rhythm.      Heart sounds: Normal heart sounds. No murmur heard.  Pulmonary:      Effort: Pulmonary effort is normal. No respiratory distress.      Breath sounds: Normal breath sounds. No wheezing.   Abdominal:      Palpations: Abdomen is soft.      Tenderness: There is no abdominal tenderness.   Musculoskeletal:         General: No deformity. Normal range of motion.      Cervical back: Normal range of motion and neck supple.   Skin:     General: Skin is warm and dry.      Coloration: Skin is not pale.      Findings: No erythema or rash.   Neurological:      Mental Status: She is alert and oriented to person, place, and time.      Cranial Nerves: No cranial nerve deficit.   Psychiatric:         Behavior: Behavior normal.         Thought Content: Thought content normal.         Procedures           ED Course  ED Course as of 03/24/24 2237   Sun Mar 03, 2024   0132 EKG at 0130 shows sinus rhythm, rate 87.  RI interval 156, QRS duration 132, QTc 486 ms.  Right bundle branch block.  Left anterior fascicular block.  Evidence for LVH.  No evidence for STEMI.  Electronically signed by Miguelito Aquino MD, 03/03/24, 1:33 AM EST.   [CM]   0418 XR chest rad interpreted:     1.  Mild enlarged heart size  2.  Mild central pulmonary vascular congestion.  3.  No edema or pneumonia  4.  No pleural effusion or pneumothorax.   [RB]      ED Course User Index  [CM] Miguelito Aquino MD  [RB] Augustus Miller II PA                                             Medical Decision Making  73-year-old female with weakness    Problems Addressed:  Hypokalemia: acute illness or injury     Details: Patient started on a regimen of potassium.  This is for 7 days.  Patient has been follow-up with her PCP and have potassium rechecked in a week.  Influenza A: acute  illness or injury     Details: Workup is positive for influenza A.  Patient will be started on Decadron 6 mg daily for 7 days.    Amount and/or Complexity of Data Reviewed  Labs: ordered.  Radiology: ordered. Decision-making details documented in ED Course.  ECG/medicine tests: ordered.    Risk  Prescription drug management.        Final diagnoses:   Influenza A   Hypokalemia       ED Disposition  ED Disposition       ED Disposition   Discharge    Condition   Stable    Comment   --               Tamara Ramirez, APRN  [address] [facility]  [address]  [place] [id number]  [phone]    Schedule an appointment as soon as possible for a visit            Medication List        New Prescriptions      dexAMETHasone 6 MG tablet  Commonly known as: DECADRON  Take 1 tablet by mouth Daily With Breakfast.            ASK your doctor about these medications      potassium chloride 20 MEQ CR tablet  Commonly known as: KLOR-CON M20  Take 2 tablets by mouth Daily for 7 days.  Ask about: Should I take this medication?               Where to Get Your Medications        These medications were sent to [facility] - [place], [place] - [address] [place] [address] - [phone]  - [phone] FX  [address] [facility] [address] [place] [address]      Phone: [phone]   dexAMETHasone 6 MG tablet  potassium chloride 20 MEQ CR tablet            Augustus Miller II, PA  03/24/24 6244

## 2024-04-04 ENCOUNTER — HOSPITAL ENCOUNTER (OUTPATIENT)
Dept: MAMMOGRAPHY | Facility: HOSPITAL | Age: 74
Discharge: HOME | End: 2024-04-04
Payer: MEDICARE

## 2024-04-04 DIAGNOSIS — Z85.3 PERSONAL HISTORY OF BREAST CANCER: ICD-10-CM

## 2024-04-04 DIAGNOSIS — Z12.31 VISIT FOR SCREENING MAMMOGRAM: ICD-10-CM

## 2024-04-04 DIAGNOSIS — Z78.0 ASYMPTOMATIC MENOPAUSAL STATE: ICD-10-CM

## 2024-04-04 PROCEDURE — 77063 BREAST TOMOSYNTHESIS BI: CPT

## 2024-04-04 PROCEDURE — 77067 SCR MAMMO BI INCL CAD: CPT

## 2024-06-04 ENCOUNTER — OFFICE VISIT (OUTPATIENT)
Dept: CARDIOLOGY | Facility: CLINIC | Age: 74
End: 2024-06-04
Payer: MEDICARE

## 2024-06-04 VITALS
WEIGHT: 167.4 LBS | SYSTOLIC BLOOD PRESSURE: 117 MMHG | HEIGHT: 66 IN | HEART RATE: 66 BPM | DIASTOLIC BLOOD PRESSURE: 79 MMHG | OXYGEN SATURATION: 96 % | BODY MASS INDEX: 26.9 KG/M2

## 2024-06-04 DIAGNOSIS — I15.0 RENOVASCULAR HYPERTENSION: ICD-10-CM

## 2024-06-04 DIAGNOSIS — I48.91 ATRIAL FIBRILLATION WITH RVR: Primary | ICD-10-CM

## 2024-06-04 DIAGNOSIS — E03.9 HYPOTHYROIDISM (ACQUIRED): ICD-10-CM

## 2024-06-04 DIAGNOSIS — I48.0 PAROXYSMAL ATRIAL FIBRILLATION: ICD-10-CM

## 2024-06-04 NOTE — PROGRESS NOTES
Office Note    Subjective     Luna Peck is a 73 y.o. female who presents to day for evaluation and follow-up      Active Problems:  Problem List Items Addressed This Visit          Cardiac and Vasculature    Atrial fibrillation with RVR - Primary    Relevant Medications    metoprolol tartrate (LOPRESSOR) 25 MG tablet    Paroxysmal atrial fibrillation    Relevant Medications    metoprolol tartrate (LOPRESSOR) 25 MG tablet    Renovascular hypertension    Relevant Medications    metoprolol tartrate (LOPRESSOR) 25 MG tablet       Endocrine and Metabolic    Hypothyroidism (acquired)    Relevant Medications    metoprolol tartrate (LOPRESSOR) 25 MG tablet       HPI  Patient is a pleasant 73-year-old female past medical history significant for paroxysmal atrial fibrillation, hypothyroidism.  Patient has been doing well.  She has paroxysmal atrial fibrillation and has an episode once every 6 months or so.  Recently had heart rate going 120s 130s which lasted for few hours.  She feels jittery when she has it otherwise she has had no chest pains or breathing problems.    PRIOR MEDS  Current Outpatient Medications on File Prior to Visit   Medication Sig Dispense Refill    alendronate (FOSAMAX) 70 MG tablet Take 1 tablet by mouth Every 7 (Seven) Days.      apixaban (ELIQUIS) 5 MG tablet tablet Take 1 tablet by mouth 2 (Two) Times a Day. 60 tablet 12    bumetanide (BUMEX) 1 MG tablet Take 1 tablet by mouth Daily As Needed (swelling).      cetirizine (zyrTEC) 10 MG tablet Take 1 tablet by mouth Every Night.      hydroCHLOROthiazide (HYDRODIURIL) 25 MG tablet Take 1 tablet by mouth Daily. 30 tablet 11    HYDROcodone-acetaminophen (NORCO) 7.5-325 MG per tablet Take 1 tablet by mouth Daily As Needed for Moderate Pain.      levothyroxine (SYNTHROID, LEVOTHROID) 137 MCG tablet Take 1 tablet by mouth Daily.      omeprazole (priLOSEC) 20 MG capsule Take 1 capsule by mouth 2 (Two) Times a Day.      potassium chloride 10 MEQ CR  "tablet Take 1 tablet by mouth Daily.      vitamin D (ERGOCALCIFEROL) 1.25 MG (85499 UT) capsule capsule Take 1 capsule by mouth 1 (One) Time Per Week. Takes Sundays      [DISCONTINUED] Diclofenac Sodium (VOLTAREN) 1 % gel gel Apply 2 g topically to the appropriate area as directed 4 (Four) Times a Day As Needed.      [DISCONTINUED] dexAMETHasone (DECADRON) 6 MG tablet Take 1 tablet by mouth Daily With Breakfast. (Patient not taking: Reported on 6/4/2024) 7 tablet 0     No current facility-administered medications on file prior to visit.       ALLERGIES  Patient has no known allergies.    HISTORY  Past Medical History:   Diagnosis Date    Arthritis     Breast cancer 2006    right    Disease of thyroid gland     GERD (gastroesophageal reflux disease)     Hypertension     Injury of back     Migraine     Osteoporosis        Social History     Socioeconomic History    Marital status:    Tobacco Use    Smoking status: Never     Passive exposure: Past    Smokeless tobacco: Never   Vaping Use    Vaping status: Never Used   Substance and Sexual Activity    Alcohol use: Yes     Alcohol/week: 3.0 standard drinks of alcohol     Types: 3 Shots of liquor per week    Drug use: Never    Sexual activity: Defer       Family History   Problem Relation Age of Onset    Breast cancer Neg Hx        Review of Systems   Respiratory:  Negative for chest tightness and shortness of breath.    Cardiovascular:  Positive for palpitations. Negative for chest pain and leg swelling.   Neurological:  Negative for dizziness, seizures, syncope, speech difficulty and light-headedness.       Objective     VITALS: /79 (BP Location: Left arm, Patient Position: Sitting, Cuff Size: Adult)   Pulse 66   Ht 167.6 cm (66\")   Wt 75.9 kg (167 lb 6.4 oz)   SpO2 96%   BMI 27.02 kg/m²     LABS:   No visits with results within 3 Month(s) from this visit.   Latest known visit with results is:   Admission on 03/03/2024, Discharged on 03/03/2024 "   Component Date Value Ref Range Status    Glucose 03/03/2024 122 (H)  65 - 99 mg/dL Final    BUN 03/03/2024 27 (H)  8 - 23 mg/dL Final    Creatinine 03/03/2024 0.98  0.57 - 1.00 mg/dL Final    Sodium 03/03/2024 141  136 - 145 mmol/L Final    Potassium 03/03/2024 2.9 (L)  3.5 - 5.2 mmol/L Final    Chloride 03/03/2024 99  98 - 107 mmol/L Final    CO2 03/03/2024 22.7  22.0 - 29.0 mmol/L Final    Calcium 03/03/2024 9.6  8.6 - 10.5 mg/dL Final    Total Protein 03/03/2024 7.7  6.0 - 8.5 g/dL Final    Albumin 03/03/2024 4.0  3.5 - 5.2 g/dL Final    ALT (SGPT) 03/03/2024 16  1 - 33 U/L Final    AST (SGOT) 03/03/2024 29  1 - 32 U/L Final    Alkaline Phosphatase 03/03/2024 63  39 - 117 U/L Final    Total Bilirubin 03/03/2024 0.7  0.0 - 1.2 mg/dL Final    Globulin 03/03/2024 3.7  gm/dL Final    A/G Ratio 03/03/2024 1.1  g/dL Final    BUN/Creatinine Ratio 03/03/2024 27.6 (H)  7.0 - 25.0 Final    Anion Gap 03/03/2024 19.3 (H)  5.0 - 15.0 mmol/L Final    eGFR 03/03/2024 60.7  >60.0 mL/min/1.73 Final    COVID19 03/03/2024 Not Detected  Not Detected - Ref. Range Final    Influenza A PCR 03/03/2024 Detected (A)  Not Detected Final    Influenza B PCR 03/03/2024 Not Detected  Not Detected Final    Color, UA 03/03/2024 Dark Yellow (A)  Yellow, Straw Final    Appearance, UA 03/03/2024 Clear  Clear Final    pH, UA 03/03/2024 6.0  5.0 - 8.0 Final    Specific Gravity, UA 03/03/2024 1.022  1.005 - 1.030 Final    Glucose, UA 03/03/2024 Negative  Negative Final    Ketones, UA 03/03/2024 Trace (A)  Negative Final    Bilirubin, UA 03/03/2024 Small (1+) (A)  Negative Final    Blood, UA 03/03/2024 Negative  Negative Final    Protein, UA 03/03/2024 Trace (A)  Negative Final    Leuk Esterase, UA 03/03/2024 Negative  Negative Final    Nitrite, UA 03/03/2024 Negative  Negative Final    Urobilinogen, UA 03/03/2024 0.2 E.U./dL  0.2 - 1.0 E.U./dL Final    Sed Rate 03/03/2024 30  0 - 30 mm/hr Final    C-Reactive Protein 03/03/2024 5.38 (H)  0.00 - 0.50  mg/dL Final    HS Troponin T 03/03/2024 36 (H)  <14 ng/L Final    proBNP 03/03/2024 1,067.0 (H)  0.0 - 900.0 pg/mL Final    Free T4 03/03/2024 1.15  0.93 - 1.70 ng/dL Final    TSH 03/03/2024 2.030  0.270 - 4.200 uIU/mL Final    Magnesium 03/03/2024 1.8  1.6 - 2.4 mg/dL Final    QT Interval 03/03/2024 404  ms Final    QTC Interval 03/03/2024 486  ms Final    WBC 03/03/2024 14.42 (H)  3.40 - 10.80 10*3/mm3 Final    RBC 03/03/2024 5.10  3.77 - 5.28 10*6/mm3 Final    Hemoglobin 03/03/2024 14.6  12.0 - 15.9 g/dL Final    Hematocrit 03/03/2024 43.0  34.0 - 46.6 % Final    MCV 03/03/2024 84.3  79.0 - 97.0 fL Final    MCH 03/03/2024 28.6  26.6 - 33.0 pg Final    MCHC 03/03/2024 34.0  31.5 - 35.7 g/dL Final    RDW 03/03/2024 12.5  12.3 - 15.4 % Final    RDW-SD 03/03/2024 38.4  37.0 - 54.0 fl Final    MPV 03/03/2024 9.9  6.0 - 12.0 fL Final    Platelets 03/03/2024 280  140 - 450 10*3/mm3 Final    Neutrophil % 03/03/2024 81.9 (H)  42.7 - 76.0 % Final    Lymphocyte % 03/03/2024 10.5 (L)  19.6 - 45.3 % Final    Monocyte % 03/03/2024 7.2  5.0 - 12.0 % Final    Eosinophil % 03/03/2024 0.0 (L)  0.3 - 6.2 % Final    Basophil % 03/03/2024 0.1  0.0 - 1.5 % Final    Immature Grans % 03/03/2024 0.3  0.0 - 0.5 % Final    Neutrophils, Absolute 03/03/2024 11.80 (H)  1.70 - 7.00 10*3/mm3 Final    Lymphocytes, Absolute 03/03/2024 1.52  0.70 - 3.10 10*3/mm3 Final    Monocytes, Absolute 03/03/2024 1.04 (H)  0.10 - 0.90 10*3/mm3 Final    Eosinophils, Absolute 03/03/2024 0.00  0.00 - 0.40 10*3/mm3 Final    Basophils, Absolute 03/03/2024 0.02  0.00 - 0.20 10*3/mm3 Final    Immature Grans, Absolute 03/03/2024 0.04  0.00 - 0.05 10*3/mm3 Final    nRBC 03/03/2024 0.0  0.0 - 0.2 /100 WBC Final    HS Troponin T 03/03/2024 35 (H)  <14 ng/L Final    Troponin T Delta 03/03/2024 -1  >=-4 - <+4 ng/L Final    Glucose 03/03/2024 109 (H)  65 - 99 mg/dL Final    BUN 03/03/2024 28 (H)  8 - 23 mg/dL Final    Creatinine 03/03/2024 0.98  0.57 - 1.00 mg/dL Final     Sodium 03/03/2024 141  136 - 145 mmol/L Final    Potassium 03/03/2024 2.8 (L)  3.5 - 5.2 mmol/L Final    Chloride 03/03/2024 101  98 - 107 mmol/L Final    CO2 03/03/2024 21.7 (L)  22.0 - 29.0 mmol/L Final    Calcium 03/03/2024 9.1  8.6 - 10.5 mg/dL Final    Total Protein 03/03/2024 7.1  6.0 - 8.5 g/dL Final    Albumin 03/03/2024 3.7  3.5 - 5.2 g/dL Final    ALT (SGPT) 03/03/2024 15  1 - 33 U/L Final    AST (SGOT) 03/03/2024 29  1 - 32 U/L Final    Alkaline Phosphatase 03/03/2024 57  39 - 117 U/L Final    Total Bilirubin 03/03/2024 0.7  0.0 - 1.2 mg/dL Final    Globulin 03/03/2024 3.4  gm/dL Final    A/G Ratio 03/03/2024 1.1  g/dL Final    BUN/Creatinine Ratio 03/03/2024 28.6 (H)  7.0 - 25.0 Final    Anion Gap 03/03/2024 18.3 (H)  5.0 - 15.0 mmol/L Final    eGFR 03/03/2024 60.7  >60.0 mL/min/1.73 Final         IMAGING:   Mammo Screening Modified With Tomosynthesis Left With CAD    Result Date: 4/4/2024    There is no mammographic evidence of malignancy.  Annual mammographic screening is recommended unless otherwise clinically indicated.  ASSESSMENT:   BI-RADS 2: Benign.    This report was finalized on 4/4/2024 2:23 PM by Dr. Moises Cardona MD.      XR Chest 1 View    Result Date: 3/3/2024   1.  Mild enlarged heart size 2.  Mild central pulmonary vascular congestion. 3.  No edema or pneumonia 4.  No pleural effusion or pneumothorax.  This report was finalized on 3/3/2024 3:19 AM by Anant Alcocer MD.      Results for orders placed during the hospital encounter of 07/06/23    Stress Test With Myocardial Perfusion One Day    Interpretation Summary    Left ventricular ejection fraction is normal.    Myocardial perfusion imaging indicates a normal myocardial perfusion study with no evidence of ischemia.    Impressions are consistent with a low risk study.    TID 0.91.    Findings consistent with a normal ECG stress test.     No results found for this or any previous visit.          EXAM:  Constitutional:       General:  Awake.      Appearance: Healthy appearance. Not in distress.   Eyes:      Conjunctiva/sclera: Conjunctivae normal.   HENT:      Head: Normocephalic and atraumatic.      Nose: Nose normal.    Mouth/Throat:      Pharynx: Oropharynx is clear.   Neck:      Thyroid: Thyroid normal.      Vascular: No carotid bruit or JVD.   Pulmonary:      Effort: Pulmonary effort is normal.      Breath sounds: Normal breath sounds.   Chest:      Chest wall: Not tender to palpatation.   Cardiovascular:      PMI at left midclavicular line. Normal rate. Regular rhythm. Normal S1 with normal intensity. Normal S2 with normal intensity.       Murmurs: There is no murmur.      No gallop.  No rub.   Pulses:     Intact distal pulses.   Edema:     Peripheral edema absent.   Abdominal:      General: Bowel sounds are normal. There is no distension.      Palpations: Abdomen is soft. There is no hepatomegaly.      Tenderness: There is no abdominal tenderness.   Musculoskeletal: Normal range of motion.      Cervical back: Normal range of motion. Skin:     General: Skin is warm and dry. There is no cyanosis.      Coloration: Skin is not jaundiced.   Neurological:      Mental Status: Alert and oriented to person, place and time.      Motor: Motor function is intact.      Gait: Gait is intact.   Psychiatric:         Attention and Perception: Attention and perception normal.         Speech: Speech normal.         Behavior: Behavior is cooperative.         Cognition and Memory: Cognition and memory normal.         Judgment: Judgment normal.          Procedure   Procedures       Assessment & Plan     Diagnoses and all orders for this visit:    1. Atrial fibrillation with RVR (Primary)    2. Paroxysmal atrial fibrillation    3. Hypothyroidism (acquired)    4. Renovascular hypertension    Other orders  -     metoprolol tartrate (LOPRESSOR) 25 MG tablet; Take 1 tablet by mouth Daily As Needed (for heart rate more than 120).  Dispense: 180 tablet; Refill:  3          PLAN  #1 Cardiac. patient with history of paroxysmal atrial fibrillation.  Will add metoprolol to heart rate as needed that she can use when her heart speeds up.  Will continue anticoagulation for now.  Arrhythmic medications have been held in the past due to bradycardia.  Will watch clinically for now.  Patient's stress test from July 8, 2023 reviewed EF is normal stress was negative for ischemia  Echo from July 7, 2023 was reviewed and had mild concentric LVH.  Normal EF.  Continue current therapy           MEDS ORDERED DURING VISIT:    Medications Discontinued During This Encounter   Medication Reason    dexAMETHasone (DECADRON) 6 MG tablet *Therapy completed    Diclofenac Sodium (VOLTAREN) 1 % gel gel *Therapy completed        New Medications Ordered This Visit   Medications    metoprolol tartrate (LOPRESSOR) 25 MG tablet     Sig: Take 1 tablet by mouth Daily As Needed (for heart rate more than 120).     Dispense:  180 tablet     Refill:  3         Follow Up:   Return in about 6 months (around 12/4/2024) for Recheck.    Patient was given instructions and counseling regarding her condition or for health maintenance advice. Please see specific information pulled into the AVS if appropriate.   As always, Tamara Ramirez APRN  I appreciate very much the opportunity to participate in the cardiovascular care of your patients. Please do not hesitate to call me with any questions with regards to Luna Anderson evaluation and management.         This document has been electronically signed by Nahum Deutsch MD [facility], Interventional Cardiology  June 4, 2024 15:43 EDT    Dictated Utilizing Dragon Dictation: Part of this note may be an electronic transcription/translation of spoken language to printed text using the Dragon Dictation System.

## 2024-06-26 ENCOUNTER — APPOINTMENT (OUTPATIENT)
Dept: CT IMAGING | Facility: HOSPITAL | Age: 74
End: 2024-06-26
Payer: MEDICARE

## 2024-06-26 ENCOUNTER — HOSPITAL ENCOUNTER (EMERGENCY)
Facility: HOSPITAL | Age: 74
Discharge: HOME | End: 2024-06-26
Attending: EMERGENCY MEDICINE
Payer: MEDICARE

## 2024-06-26 VITALS
OXYGEN SATURATION: 97 % | HEIGHT: 66 IN | RESPIRATION RATE: 14 BRPM | BODY MASS INDEX: 27.16 KG/M2 | TEMPERATURE: 98.6 F | WEIGHT: 169 LBS | HEART RATE: 64 BPM | DIASTOLIC BLOOD PRESSURE: 74 MMHG | SYSTOLIC BLOOD PRESSURE: 145 MMHG

## 2024-06-26 DIAGNOSIS — S32.020A CLOSED COMPRESSION FRACTURE OF L2 VERTEBRA, INITIAL ENCOUNTER: Primary | ICD-10-CM

## 2024-06-26 PROCEDURE — 72125 CT NECK SPINE W/O DYE: CPT

## 2024-06-26 PROCEDURE — 99284 EMERGENCY DEPT VISIT MOD MDM: CPT

## 2024-06-26 PROCEDURE — 72131 CT LUMBAR SPINE W/O DYE: CPT

## 2024-06-26 PROCEDURE — 72128 CT CHEST SPINE W/O DYE: CPT

## 2024-06-26 PROCEDURE — 72125 CT NECK SPINE W/O DYE: CPT | Performed by: RADIOLOGY

## 2024-06-26 PROCEDURE — 72131 CT LUMBAR SPINE W/O DYE: CPT | Performed by: RADIOLOGY

## 2024-06-26 PROCEDURE — 72128 CT CHEST SPINE W/O DYE: CPT | Performed by: RADIOLOGY

## 2024-06-26 NOTE — ED PROVIDER NOTES
Subjective   History of Present Illness  73-year-old female with past medical history of arthritis, breast cancer, thyroid disease, GERD, hypertension, migraines, and osteoporosis presents to the emergency room after a fall she sustained 2 days ago.  Patient states she was picking wild raspberries when she looked up and became dizzy.  She states that she fell back and since that time has had neck and back pain.  She denies hitting her head or loss of consciousness.  She does state that she has injured her back before and never got evaluated but states this time it is hurting more.  She denies any other injuries, complaints, or concerns at this time.    History provided by:  Patient   used: No        Review of Systems   Constitutional: Negative.  Negative for fever.   HENT: Negative.     Respiratory: Negative.     Cardiovascular: Negative.  Negative for chest pain.   Gastrointestinal: Negative.  Negative for abdominal pain.   Endocrine: Negative.    Genitourinary: Negative.  Negative for dysuria.   Musculoskeletal:  Positive for back pain and neck pain.   Skin: Negative.    Neurological: Negative.    Psychiatric/Behavioral: Negative.     All other systems reviewed and are negative.      Past Medical History:   Diagnosis Date    Arthritis     Breast cancer 2006    right    Disease of thyroid gland     GERD (gastroesophageal reflux disease)     Hypertension     Injury of back     Migraine     Osteoporosis        No Known Allergies    Past Surgical History:   Procedure Laterality Date    BREAST BIOPSY Left 10/2019    benign    MASTECTOMY Right 2006       Family History   Problem Relation Age of Onset    Breast cancer Neg Hx        Social History     Socioeconomic History    Marital status:    Tobacco Use    Smoking status: Never     Passive exposure: Past    Smokeless tobacco: Never   Vaping Use    Vaping status: Never Used   Substance and Sexual Activity    Alcohol use: Yes     Alcohol/week:  3.0 standard drinks of alcohol     Types: 3 Shots of liquor per week    Drug use: Never    Sexual activity: Defer           Objective   Physical Exam  Vitals and nursing note reviewed.   Constitutional:       General: She is not in acute distress.     Appearance: She is well-developed. She is not diaphoretic.   HENT:      Head: Normocephalic and atraumatic.      Right Ear: External ear normal.      Left Ear: External ear normal.      Nose: Nose normal.   Eyes:      Conjunctiva/sclera: Conjunctivae normal.      Pupils: Pupils are equal, round, and reactive to light.   Neck:      Vascular: No JVD.      Trachea: No tracheal deviation.   Cardiovascular:      Rate and Rhythm: Normal rate and regular rhythm.      Heart sounds: Normal heart sounds. No murmur heard.  Pulmonary:      Effort: Pulmonary effort is normal. No respiratory distress.      Breath sounds: Normal breath sounds. No wheezing.   Abdominal:      General: Bowel sounds are normal.      Palpations: Abdomen is soft.      Tenderness: There is no abdominal tenderness.   Musculoskeletal:         General: No deformity. Normal range of motion.      Cervical back: Normal, normal range of motion and neck supple.      Thoracic back: Normal.      Lumbar back: Tenderness present.        Back:    Skin:     General: Skin is warm and dry.      Coloration: Skin is not pale.      Findings: No erythema or rash.   Neurological:      Mental Status: She is alert and oriented to person, place, and time.      Cranial Nerves: No cranial nerve deficit.   Psychiatric:         Behavior: Behavior normal.         Thought Content: Thought content normal.         Splint - Cast - Strapping    Date/Time: 6/26/2024 2:46 PM    Performed by: Bao Reyes PA-C  Authorized by: Jmeal Silva MD    Consent:     Consent obtained:  Verbal    Consent given by:  Patient    Risks, benefits, and alternatives were discussed: yes      Risks discussed:  Discoloration, numbness, pain and  swelling    Alternatives discussed:  No treatment, delayed treatment, alternative treatment, observation and referral  Universal protocol:     Procedure explained and questions answered to patient or proxy's satisfaction: yes      Relevant documents present and verified: yes      Test results available: yes      Imaging studies available: yes      Required blood products, implants, devices, and special equipment available: yes      Site/side marked: yes      Immediately prior to procedure a time out was called: yes      Patient identity confirmed:  Verbally with patient, arm band, provided demographic data and hospital-assigned identification number  Pre-procedure details:     Distal neurologic exam:  Normal    Distal perfusion: distal pulses strong    Procedure details:     Location: [facility].    Supplies:  Prefabricated splint    Splint applied and adjusted personally by me: Brace applied by tech, check by me..    Post-procedure details:     Distal neurologic exam:  Normal    Distal perfusion: distal pulses strong      Procedure completion:  Tolerated well, no immediate complications    Post-procedure imaging: not applicable    Comments:      Pt tolerated brace application well. No acute complications.             ED Course  ED Course as of 06/26/24 1447   Wed Jun 26, 2024   1236 CT Thoracic Spine Without Contrast [TK]   1236 CT Lumbar Spine Without Contrast [TK]   1236 CT Cervical Spine Without Contrast [TK]      ED Course User Index  [TK] Bao Reyes, PACHERYL                                   CT Thoracic Spine Without Contrast   Final Result     Degenerative changes thoracic spine as described.           This report was finalized on 6/26/2024 12:27 PM by Dr. Moises Cardona MD.          CT Lumbar Spine Without Contrast   Final Result   1.  Degenerative changes lumbar spine as described.   2.  L2 vertebral body superior endplate compression deformity that is   thought to be chronic but clinical correlation is  recommended in this   region.           This report was finalized on 6/26/2024 12:23 PM by Dr. Moises Cardona MD.          CT Cervical Spine Without Contrast   Final Result     Degenerative changes cervical spine as described.           This report was finalized on 6/26/2024 12:22 PM by Dr. Moises Cardona MD.                        Medical Decision Making  73-year-old female with past medical history of arthritis, breast cancer, thyroid disease, GERD, hypertension, migraines, and osteoporosis presents to the emergency room after a fall she sustained 2 days ago.  Patient states she was picking wild raspberries when she looked up and became dizzy.  She states that she fell back and since that time has had neck and back pain.  She denies hitting her head or loss of consciousness.  She does state that she has injured her back before and never got evaluated but states this time it is hurting more.  She denies any other injuries, complaints, or concerns at this time.      Problems Addressed:  Closed compression fracture of L2 vertebra, initial encounter: complicated acute illness or injury    Amount and/or Complexity of Data Reviewed  Radiology: ordered. Decision-making details documented in ED Course.        Final diagnoses:   Closed compression fracture of L2 vertebra, initial encounter       ED Disposition  ED Disposition       ED Disposition   Discharge    Condition   Stable    Comment   --               Oscar Reyes, DO  [address] [place] [address] [phone]    In 2 days           Medication List      No changes were made to your prescriptions during this visit.            Bao Reyes PA-C  06/26/24 [id number]

## 2024-11-14 RX ORDER — HYDROCHLOROTHIAZIDE 25 MG/1
25 TABLET ORAL DAILY
Qty: 30 TABLET | Refills: 11 | Status: SHIPPED | OUTPATIENT
Start: 2024-11-14

## 2024-12-05 ENCOUNTER — OFFICE VISIT (OUTPATIENT)
Dept: CARDIOLOGY | Facility: CLINIC | Age: 74
End: 2024-12-05
Payer: MEDICARE

## 2024-12-05 VITALS
BODY MASS INDEX: 28.12 KG/M2 | OXYGEN SATURATION: 96 % | DIASTOLIC BLOOD PRESSURE: 96 MMHG | SYSTOLIC BLOOD PRESSURE: 171 MMHG | HEART RATE: 51 BPM | WEIGHT: 175 LBS | HEIGHT: 66 IN

## 2024-12-05 DIAGNOSIS — I48.91 ATRIAL FIBRILLATION WITH RVR: Primary | ICD-10-CM

## 2024-12-05 DIAGNOSIS — I48.0 PAROXYSMAL ATRIAL FIBRILLATION: ICD-10-CM

## 2024-12-05 DIAGNOSIS — I15.0 RENOVASCULAR HYPERTENSION: ICD-10-CM

## 2024-12-05 DIAGNOSIS — E03.9 HYPOTHYROIDISM (ACQUIRED): ICD-10-CM

## 2024-12-05 RX ORDER — LOSARTAN POTASSIUM 25 MG/1
25 TABLET ORAL DAILY
Qty: 30 TABLET | Refills: 12 | Status: SHIPPED | OUTPATIENT
Start: 2024-12-05

## 2024-12-05 RX ORDER — FLUTICASONE PROPIONATE 50 MCG
SPRAY, SUSPENSION (ML) NASAL DAILY
COMMUNITY
Start: 2024-11-05

## 2024-12-05 NOTE — PROGRESS NOTES
Office Note    Subjective     Luna Peck is a 74 y.o. female who presents to day for follow-up      Active Problems:  Problem List Items Addressed This Visit          Cardiac and Vasculature    Atrial fibrillation with RVR - Primary    Relevant Orders    Ambulatory Referral to Cardiac Electrophysiology    Paroxysmal atrial fibrillation    Renovascular hypertension    Relevant Medications    losartan (Cozaar) 25 MG tablet       Endocrine and Metabolic    Hypothyroidism (acquired)       HPI  Patient is a pleasant 74-year-old female past medical history significant for hypertension, paroxysmal atrial fibrillation in for routine follow-up.  Patient has been having frequent episodes of paroxysmal A-fib with heart rate going in 140s 150s.  She has been recently cooking and getting ready for the festivities and did not get enough sleep the next day had episodes of heart rate speed up in 150s range.  She takes metoprolol as needed only.  Her heart rate at baseline is 49 today.  Her blood pressure is high also.    PRIOR MEDS  Current Outpatient Medications on File Prior to Visit   Medication Sig Dispense Refill    alendronate (FOSAMAX) 70 MG tablet Take 1 tablet by mouth Every 7 (Seven) Days.      apixaban (ELIQUIS) 5 MG tablet tablet Take 1 tablet by mouth 2 (Two) Times a Day. 60 tablet 12    bumetanide (BUMEX) 1 MG tablet Take 1 tablet by mouth Daily As Needed (swelling).      cetirizine (zyrTEC) 10 MG tablet Take 1 tablet by mouth Every Night.      Diclofenac Sodium (VOLTAREN) 1 % gel gel As Needed.      Docusate Sodium (COLACE PO) Take  by mouth 2 (Two) Times a Day.      fluticasone (FLONASE) 50 MCG/ACT nasal spray Daily.      hydroCHLOROthiazide 25 MG tablet TAKE ONE TABLET BY MOUTH ONCE DAILY 30 tablet 11    HYDROcodone-acetaminophen (NORCO) 7.5-325 MG per tablet Take 1 tablet by mouth Daily As Needed for Moderate Pain.      levothyroxine (SYNTHROID, LEVOTHROID) 137 MCG tablet Take 1 tablet by mouth Daily.       "metoprolol tartrate (LOPRESSOR) 25 MG tablet Take 1 tablet by mouth Daily As Needed (for heart rate more than 120). 180 tablet 3    potassium chloride 10 MEQ CR tablet Take 1 tablet by mouth Daily.      vitamin D (ERGOCALCIFEROL) 1.25 MG (54153 UT) capsule capsule Take 1 capsule by mouth 1 (One) Time Per Week. Takes Sundays      [DISCONTINUED] omeprazole (priLOSEC) 20 MG capsule Take 1 capsule by mouth 2 (Two) Times a Day. (Patient not taking: Reported on 12/5/2024)       No current facility-administered medications on file prior to visit.       ALLERGIES  Patient has no known allergies.    HISTORY  Past Medical History:   Diagnosis Date    Arthritis     Breast cancer 2006    right    Disease of thyroid gland     GERD (gastroesophageal reflux disease)     Hypertension     Injury of back     Migraine     Osteoporosis        Social History     Socioeconomic History    Marital status:    Tobacco Use    Smoking status: Former     Types: Cigarettes     Passive exposure: Past    Smokeless tobacco: Never   Vaping Use    Vaping status: Never Used   Substance and Sexual Activity    Alcohol use: Yes     Alcohol/week: 3.0 standard drinks of alcohol     Types: 3 Shots of liquor per week     Comment: occ.    Drug use: Never    Sexual activity: Defer       Family History   Problem Relation Age of Onset    Breast cancer Neg Hx        Review of Systems   Respiratory:  Negative for chest tightness and shortness of breath.    Cardiovascular:  Positive for palpitations. Negative for chest pain and leg swelling.   Neurological:  Positive for dizziness and light-headedness. Negative for seizures, syncope and headaches.       Objective     VITALS: /96 (BP Location: Left arm, Patient Position: Sitting, Cuff Size: Large Adult)   Pulse 51   Ht 167.6 cm (66\")   Wt 79.4 kg (175 lb)   SpO2 96%   BMI 28.25 kg/m²     LABS:   No visits with results within 3 Month(s) from this visit.   Latest known visit with results is: "   Admission on 03/03/2024, Discharged on 03/03/2024   Component Date Value Ref Range Status    Glucose 03/03/2024 122 (H)  65 - 99 mg/dL Final    BUN 03/03/2024 27 (H)  8 - 23 mg/dL Final    Creatinine 03/03/2024 0.98  0.57 - 1.00 mg/dL Final    Sodium 03/03/2024 141  136 - 145 mmol/L Final    Potassium 03/03/2024 2.9 (L)  3.5 - 5.2 mmol/L Final    Chloride 03/03/2024 99  98 - 107 mmol/L Final    CO2 03/03/2024 22.7  22.0 - 29.0 mmol/L Final    Calcium 03/03/2024 9.6  8.6 - 10.5 mg/dL Final    Total Protein 03/03/2024 7.7  6.0 - 8.5 g/dL Final    Albumin 03/03/2024 4.0  3.5 - 5.2 g/dL Final    ALT (SGPT) 03/03/2024 16  1 - 33 U/L Final    AST (SGOT) 03/03/2024 29  1 - 32 U/L Final    Alkaline Phosphatase 03/03/2024 63  39 - 117 U/L Final    Total Bilirubin 03/03/2024 0.7  0.0 - 1.2 mg/dL Final    Globulin 03/03/2024 3.7  gm/dL Final    A/G Ratio 03/03/2024 1.1  g/dL Final    BUN/Creatinine Ratio 03/03/2024 27.6 (H)  7.0 - 25.0 Final    Anion Gap 03/03/2024 19.3 (H)  5.0 - 15.0 mmol/L Final    eGFR 03/03/2024 60.7  >60.0 mL/min/1.73 Final    COVID19 03/03/2024 Not Detected  Not Detected - Ref. Range Final    Influenza A PCR 03/03/2024 Detected (A)  Not Detected Final    Influenza B PCR 03/03/2024 Not Detected  Not Detected Final    Color, UA 03/03/2024 Dark Yellow (A)  Yellow, Straw Final    Appearance, UA 03/03/2024 Clear  Clear Final    pH, UA 03/03/2024 6.0  5.0 - 8.0 Final    Specific Gravity, UA 03/03/2024 1.022  1.005 - 1.030 Final    Glucose, UA 03/03/2024 Negative  Negative Final    Ketones, UA 03/03/2024 Trace (A)  Negative Final    Bilirubin, UA 03/03/2024 Small (1+) (A)  Negative Final    Blood, UA 03/03/2024 Negative  Negative Final    Protein, UA 03/03/2024 Trace (A)  Negative Final    Leuk Esterase, UA 03/03/2024 Negative  Negative Final    Nitrite, UA 03/03/2024 Negative  Negative Final    Urobilinogen, UA 03/03/2024 0.2 E.U./dL  0.2 - 1.0 E.U./dL Final    Sed Rate 03/03/2024 30  0 - 30 mm/hr Final     C-Reactive Protein 03/03/2024 5.38 (H)  0.00 - 0.50 mg/dL Final    HS Troponin T 03/03/2024 36 (H)  <14 ng/L Final    proBNP 03/03/2024 1,067.0 (H)  0.0 - 900.0 pg/mL Final    Free T4 03/03/2024 1.15  0.93 - 1.70 ng/dL Final    TSH 03/03/2024 2.030  0.270 - 4.200 uIU/mL Final    Magnesium 03/03/2024 1.8  1.6 - 2.4 mg/dL Final    QT Interval 03/03/2024 404  ms Final    QTC Interval 03/03/2024 486  ms Final    WBC 03/03/2024 14.42 (H)  3.40 - 10.80 10*3/mm3 Final    RBC 03/03/2024 5.10  3.77 - 5.28 10*6/mm3 Final    Hemoglobin 03/03/2024 14.6  12.0 - 15.9 g/dL Final    Hematocrit 03/03/2024 43.0  34.0 - 46.6 % Final    MCV 03/03/2024 84.3  79.0 - 97.0 fL Final    MCH 03/03/2024 28.6  26.6 - 33.0 pg Final    MCHC 03/03/2024 34.0  31.5 - 35.7 g/dL Final    RDW 03/03/2024 12.5  12.3 - 15.4 % Final    RDW-SD 03/03/2024 38.4  37.0 - 54.0 fl Final    MPV 03/03/2024 9.9  6.0 - 12.0 fL Final    Platelets 03/03/2024 280  140 - 450 10*3/mm3 Final    Neutrophil % 03/03/2024 81.9 (H)  42.7 - 76.0 % Final    Lymphocyte % 03/03/2024 10.5 (L)  19.6 - 45.3 % Final    Monocyte % 03/03/2024 7.2  5.0 - 12.0 % Final    Eosinophil % 03/03/2024 0.0 (L)  0.3 - 6.2 % Final    Basophil % 03/03/2024 0.1  0.0 - 1.5 % Final    Immature Grans % 03/03/2024 0.3  0.0 - 0.5 % Final    Neutrophils, Absolute 03/03/2024 11.80 (H)  1.70 - 7.00 10*3/mm3 Final    Lymphocytes, Absolute 03/03/2024 1.52  0.70 - 3.10 10*3/mm3 Final    Monocytes, Absolute 03/03/2024 1.04 (H)  0.10 - 0.90 10*3/mm3 Final    Eosinophils, Absolute 03/03/2024 0.00  0.00 - 0.40 10*3/mm3 Final    Basophils, Absolute 03/03/2024 0.02  0.00 - 0.20 10*3/mm3 Final    Immature Grans, Absolute 03/03/2024 0.04  0.00 - 0.05 10*3/mm3 Final    nRBC 03/03/2024 0.0  0.0 - 0.2 /100 WBC Final    HS Troponin T 03/03/2024 35 (H)  <14 ng/L Final    Troponin T Delta 03/03/2024 -1  >=-4 - <+4 ng/L Final    Glucose 03/03/2024 109 (H)  65 - 99 mg/dL Final    BUN 03/03/2024 28 (H)  8 - 23 mg/dL Final     Creatinine 03/03/2024 0.98  0.57 - 1.00 mg/dL Final    Sodium 03/03/2024 141  136 - 145 mmol/L Final    Potassium 03/03/2024 2.8 (L)  3.5 - 5.2 mmol/L Final    Chloride 03/03/2024 101  98 - 107 mmol/L Final    CO2 03/03/2024 21.7 (L)  22.0 - 29.0 mmol/L Final    Calcium 03/03/2024 9.1  8.6 - 10.5 mg/dL Final    Total Protein 03/03/2024 7.1  6.0 - 8.5 g/dL Final    Albumin 03/03/2024 3.7  3.5 - 5.2 g/dL Final    ALT (SGPT) 03/03/2024 15  1 - 33 U/L Final    AST (SGOT) 03/03/2024 29  1 - 32 U/L Final    Alkaline Phosphatase 03/03/2024 57  39 - 117 U/L Final    Total Bilirubin 03/03/2024 0.7  0.0 - 1.2 mg/dL Final    Globulin 03/03/2024 3.4  gm/dL Final    A/G Ratio 03/03/2024 1.1  g/dL Final    BUN/Creatinine Ratio 03/03/2024 28.6 (H)  7.0 - 25.0 Final    Anion Gap 03/03/2024 18.3 (H)  5.0 - 15.0 mmol/L Final    eGFR 03/03/2024 60.7  >60.0 mL/min/1.73 Final         IMAGING:   No Images in the past 120 days found..  Results for orders placed during the hospital encounter of 07/06/23    Stress Test With Myocardial Perfusion One Day    Interpretation Summary    Left ventricular ejection fraction is normal.    Myocardial perfusion imaging indicates a normal myocardial perfusion study with no evidence of ischemia.    Impressions are consistent with a low risk study.    TID 0.91.    Findings consistent with a normal ECG stress test.     No results found for this or any previous visit.          EXAM:  Constitutional:       General: Awake.      Appearance: Healthy appearance. Not in distress.   Eyes:      Conjunctiva/sclera: Conjunctivae normal.   HENT:      Head: Normocephalic and atraumatic.      Nose: Nose normal.    Mouth/Throat:      Pharynx: Oropharynx is clear.   Neck:      Thyroid: Thyroid normal.      Vascular: No carotid bruit or JVD.   Pulmonary:      Effort: Pulmonary effort is normal.      Breath sounds: Normal breath sounds.   Chest:      Chest wall: Not tender to palpatation.   Cardiovascular:      PMI at left  midclavicular line. Normal rate. Regular rhythm. Normal S1 with normal intensity. Normal S2 with normal intensity.       Murmurs: There is no murmur.      No gallop.  No rub.   Pulses:     Intact distal pulses.   Edema:     Peripheral edema absent.   Abdominal:      General: Bowel sounds are normal. There is no distension.      Palpations: Abdomen is soft. There is no hepatomegaly.      Tenderness: There is no abdominal tenderness.   Musculoskeletal: Normal range of motion.      Cervical back: Normal range of motion. Skin:     General: Skin is warm and dry. There is no cyanosis.      Coloration: Skin is not jaundiced.   Neurological:      Mental Status: Alert and oriented to person, place and time.      Motor: Motor function is intact.      Gait: Gait is intact.   Psychiatric:         Attention and Perception: Attention and perception normal.         Speech: Speech normal.         Behavior: Behavior is cooperative.         Cognition and Memory: Cognition and memory normal.         Judgment: Judgment normal.          Procedure   Procedures       Assessment & Plan     Diagnoses and all orders for this visit:    1. Atrial fibrillation with RVR (Primary)  -     Ambulatory Referral to Cardiac Electrophysiology    2. Renovascular hypertension    3. Paroxysmal atrial fibrillation    4. Hypothyroidism (acquired)    Other orders  -     losartan (Cozaar) 25 MG tablet; Take 1 tablet by mouth Daily.  Dispense: 30 tablet; Refill: 12          PLAN  1 cardiac.  Patient with history of paroxysmal atrial fibrillation not on any antiarrhythmics currently due to her baseline heart rate in 40s.  Takes as needed Lopressor only.  Discussed about Multaq, Rythmol to prevent A-fib.  Concerned about heart slowing down too much.  Will refer to EP for possible evaluation and ablation.  Discussed about possible pacemaker placement in future also  #2 hypertension.  Patient blood pressure is running high.  Will add losartan to her regimen.  3  hypothyroidism.  Patient is on replacement therapy.  Continue same         MEDS ORDERED DURING VISIT:    Medications Discontinued During This Encounter   Medication Reason    omeprazole (priLOSEC) 20 MG capsule Historical Med - Therapy completed        New Medications Ordered This Visit   Medications    losartan (Cozaar) 25 MG tablet     Sig: Take 1 tablet by mouth Daily.     Dispense:  30 tablet     Refill:  12         Follow Up:   Return in about 6 months (around 6/5/2025) for Recheck.    Patient was given instructions and counseling regarding her condition or for health maintenance advice. Please see specific information pulled into the AVS if appropriate.   As always, Tamara Ramirez APRN  I appreciate very much the opportunity to participate in the cardiovascular care of your patients. Please do not hesitate to call me with any questions with regards to Luna Peck evaluation and management.         This document has been electronically signed by Nahum Deutsch MD [facility], Interventional Cardiology  December 5, 2024 14:29 EST    Dictated Utilizing Dragon Dictation: Part of this note may be an electronic transcription/translation of spoken language to printed text using the Dragon Dictation System.

## 2025-01-03 RX ORDER — APIXABAN 5 MG/1
5 TABLET, FILM COATED ORAL 2 TIMES DAILY
Qty: 60 TABLET | Refills: 12 | Status: SHIPPED | OUTPATIENT
Start: 2025-01-03

## 2025-04-10 ENCOUNTER — TRANSCRIBE ORDERS (OUTPATIENT)
Dept: ADMINISTRATIVE | Facility: HOSPITAL | Age: 75
End: 2025-04-10
Payer: MEDICARE

## 2025-04-10 DIAGNOSIS — Z12.31 SCREENING MAMMOGRAM FOR BREAST CANCER: Primary | ICD-10-CM

## 2025-04-21 ENCOUNTER — HOSPITAL ENCOUNTER (EMERGENCY)
Facility: HOSPITAL | Age: 75
Discharge: HOME | End: 2025-04-21
Admitting: STUDENT IN AN ORGANIZED HEALTH CARE EDUCATION/TRAINING PROGRAM
Payer: MEDICARE

## 2025-04-21 ENCOUNTER — APPOINTMENT (OUTPATIENT)
Dept: GENERAL RADIOLOGY | Facility: HOSPITAL | Age: 75
End: 2025-04-21
Payer: MEDICARE

## 2025-04-21 VITALS
BODY MASS INDEX: 27.97 KG/M2 | WEIGHT: 174 LBS | RESPIRATION RATE: 20 BRPM | DIASTOLIC BLOOD PRESSURE: 89 MMHG | OXYGEN SATURATION: 95 % | SYSTOLIC BLOOD PRESSURE: 122 MMHG | HEIGHT: 66 IN | HEART RATE: 80 BPM | TEMPERATURE: 98.4 F

## 2025-04-21 DIAGNOSIS — I48.91 ATRIAL FIBRILLATION WITH RAPID VENTRICULAR RESPONSE: Primary | ICD-10-CM

## 2025-04-21 LAB
ALBUMIN SERPL-MCNC: 4.3 G/DL (ref 3.5–5.2)
ALBUMIN/GLOB SERPL: 1.2 G/DL
ALP SERPL-CCNC: 68 U/L (ref 39–117)
ALT SERPL W P-5'-P-CCNC: 15 U/L (ref 1–33)
ANION GAP SERPL CALCULATED.3IONS-SCNC: 11.4 MMOL/L (ref 5–15)
AST SERPL-CCNC: 21 U/L (ref 1–32)
BASOPHILS # BLD AUTO: 0.03 10*3/MM3 (ref 0–0.2)
BASOPHILS NFR BLD AUTO: 0.5 % (ref 0–1.5)
BILIRUB SERPL-MCNC: 0.3 MG/DL (ref 0–1.2)
BUN SERPL-MCNC: 19 MG/DL (ref 8–23)
BUN/CREAT SERPL: 16.8 (ref 7–25)
CALCIUM SPEC-SCNC: 10.1 MG/DL (ref 8.6–10.5)
CHLORIDE SERPL-SCNC: 104 MMOL/L (ref 98–107)
CK SERPL-CCNC: 166 U/L (ref 20–180)
CO2 SERPL-SCNC: 26.6 MMOL/L (ref 22–29)
CREAT SERPL-MCNC: 1.13 MG/DL (ref 0.57–1)
CRP SERPL-MCNC: <0.3 MG/DL (ref 0–0.5)
DEPRECATED RDW RBC AUTO: 38.3 FL (ref 37–54)
EGFRCR SERPLBLD CKD-EPI 2021: 50.8 ML/MIN/1.73
EOSINOPHIL # BLD AUTO: 0.55 10*3/MM3 (ref 0–0.4)
EOSINOPHIL NFR BLD AUTO: 8.5 % (ref 0.3–6.2)
ERYTHROCYTE [DISTWIDTH] IN BLOOD BY AUTOMATED COUNT: 12.6 % (ref 12.3–15.4)
GEN 5 1HR TROPONIN T REFLEX: 15 NG/L
GLOBULIN UR ELPH-MCNC: 3.5 GM/DL
GLUCOSE SERPL-MCNC: 111 MG/DL (ref 65–99)
HCT VFR BLD AUTO: 38.1 % (ref 34–46.6)
HGB BLD-MCNC: 12.5 G/DL (ref 12–15.9)
HOLD SPECIMEN: NORMAL
HOLD SPECIMEN: NORMAL
IMM GRANULOCYTES # BLD AUTO: 0.01 10*3/MM3 (ref 0–0.05)
IMM GRANULOCYTES NFR BLD AUTO: 0.2 % (ref 0–0.5)
LYMPHOCYTES # BLD AUTO: 2.13 10*3/MM3 (ref 0.7–3.1)
LYMPHOCYTES NFR BLD AUTO: 32.8 % (ref 19.6–45.3)
MAGNESIUM SERPL-MCNC: 2 MG/DL (ref 1.6–2.4)
MCH RBC QN AUTO: 27.4 PG (ref 26.6–33)
MCHC RBC AUTO-ENTMCNC: 32.8 G/DL (ref 31.5–35.7)
MCV RBC AUTO: 83.6 FL (ref 79–97)
MONOCYTES # BLD AUTO: 0.49 10*3/MM3 (ref 0.1–0.9)
MONOCYTES NFR BLD AUTO: 7.5 % (ref 5–12)
NEUTROPHILS NFR BLD AUTO: 3.29 10*3/MM3 (ref 1.7–7)
NEUTROPHILS NFR BLD AUTO: 50.5 % (ref 42.7–76)
NRBC BLD AUTO-RTO: 0 /100 WBC (ref 0–0.2)
NT-PROBNP SERPL-MCNC: 313.3 PG/ML (ref 0–1800)
PLATELET # BLD AUTO: 278 10*3/MM3 (ref 140–450)
PMV BLD AUTO: 10 FL (ref 6–12)
POTASSIUM SERPL-SCNC: 3.2 MMOL/L (ref 3.5–5.2)
PROT SERPL-MCNC: 7.8 G/DL (ref 6–8.5)
RBC # BLD AUTO: 4.56 10*6/MM3 (ref 3.77–5.28)
SODIUM SERPL-SCNC: 142 MMOL/L (ref 136–145)
TROPONIN T NUMERIC DELTA: 3 NG/L
TROPONIN T SERPL HS-MCNC: 12 NG/L
TROPONIN T SERPL HS-MCNC: 12 NG/L
TSH SERPL DL<=0.05 MIU/L-ACNC: 2.54 UIU/ML (ref 0.27–4.2)
WBC NRBC COR # BLD AUTO: 6.5 10*3/MM3 (ref 3.4–10.8)
WHOLE BLOOD HOLD COAG: NORMAL
WHOLE BLOOD HOLD SPECIMEN: NORMAL

## 2025-04-21 PROCEDURE — 96365 THER/PROPH/DIAG IV INF INIT: CPT

## 2025-04-21 PROCEDURE — 71045 X-RAY EXAM CHEST 1 VIEW: CPT

## 2025-04-21 PROCEDURE — 86140 C-REACTIVE PROTEIN: CPT | Performed by: PHYSICIAN ASSISTANT

## 2025-04-21 PROCEDURE — 83735 ASSAY OF MAGNESIUM: CPT | Performed by: PHYSICIAN ASSISTANT

## 2025-04-21 PROCEDURE — 84443 ASSAY THYROID STIM HORMONE: CPT | Performed by: PHYSICIAN ASSISTANT

## 2025-04-21 PROCEDURE — 96366 THER/PROPH/DIAG IV INF ADDON: CPT

## 2025-04-21 PROCEDURE — 71045 X-RAY EXAM CHEST 1 VIEW: CPT | Performed by: RADIOLOGY

## 2025-04-21 PROCEDURE — 84484 ASSAY OF TROPONIN QUANT: CPT | Performed by: NURSE PRACTITIONER

## 2025-04-21 PROCEDURE — 93005 ELECTROCARDIOGRAM TRACING: CPT | Performed by: NURSE PRACTITIONER

## 2025-04-21 PROCEDURE — 36415 COLL VENOUS BLD VENIPUNCTURE: CPT | Performed by: NURSE PRACTITIONER

## 2025-04-21 PROCEDURE — 85025 COMPLETE CBC W/AUTO DIFF WBC: CPT | Performed by: NURSE PRACTITIONER

## 2025-04-21 PROCEDURE — 99284 EMERGENCY DEPT VISIT MOD MDM: CPT

## 2025-04-21 PROCEDURE — 82550 ASSAY OF CK (CPK): CPT | Performed by: PHYSICIAN ASSISTANT

## 2025-04-21 PROCEDURE — 80053 COMPREHEN METABOLIC PANEL: CPT | Performed by: NURSE PRACTITIONER

## 2025-04-21 PROCEDURE — 84484 ASSAY OF TROPONIN QUANT: CPT | Performed by: PHYSICIAN ASSISTANT

## 2025-04-21 PROCEDURE — 83880 ASSAY OF NATRIURETIC PEPTIDE: CPT | Performed by: PHYSICIAN ASSISTANT

## 2025-04-21 PROCEDURE — 96375 TX/PRO/DX INJ NEW DRUG ADDON: CPT

## 2025-04-21 RX ORDER — ASPIRIN 81 MG/1
324 TABLET, CHEWABLE ORAL ONCE
Status: COMPLETED | OUTPATIENT
Start: 2025-04-21 | End: 2025-04-21

## 2025-04-21 RX ORDER — METOPROLOL TARTRATE 1 MG/ML
2.5 INJECTION, SOLUTION INTRAVENOUS ONCE
Status: COMPLETED | OUTPATIENT
Start: 2025-04-21 | End: 2025-04-21

## 2025-04-21 RX ORDER — SODIUM CHLORIDE 0.9 % (FLUSH) 0.9 %
10 SYRINGE (ML) INJECTION AS NEEDED
Status: DISCONTINUED | OUTPATIENT
Start: 2025-04-21 | End: 2025-04-22 | Stop reason: HOSPADM

## 2025-04-21 RX ORDER — POTASSIUM CHLORIDE 1.5 G/1.58G
40 POWDER, FOR SOLUTION ORAL ONCE
Status: COMPLETED | OUTPATIENT
Start: 2025-04-21 | End: 2025-04-21

## 2025-04-21 RX ADMIN — ASPIRIN 324 MG: 81 TABLET, CHEWABLE ORAL at 21:06

## 2025-04-21 RX ADMIN — SODIUM CHLORIDE 5 MG/HR: 900 INJECTION, SOLUTION INTRAVENOUS at 20:16

## 2025-04-21 RX ADMIN — METOPROLOL TARTRATE 2.5 MG: 1 INJECTION, SOLUTION INTRAVENOUS at 21:07

## 2025-04-21 RX ADMIN — POTASSIUM CHLORIDE 40 MEQ: 1.5 POWDER, FOR SOLUTION ORAL at 21:07

## 2025-04-21 NOTE — ED NOTES
MEDICAL SCREENING:    Reason for Visit: Feels like heart is racing; palpitations; history of atrial fibrillation      Patient initially seen in triage.  The patient was advised further evaluation and diagnostic testing will be needed, some of the treatment and testing will be initiated in the lobby in order to begin the process.  The patient will be returned to the waiting area for the time being and possibly be re-assessed by a subsequent ED provider.  The patient will be brought back to the treatment area in as timely manner as possible.       Etienne Miller, APRN  04/21/25 [id number]

## 2025-04-22 LAB
QT INTERVAL: 330 MS
QTC INTERVAL: 496 MS

## 2025-04-22 NOTE — DISCHARGE INSTRUCTIONS
You may want to go ahead and always have your Lopressor on you at all times.  I see that your previous cardiology had mentioned taking this if you do start to have a faster heart rate.  Be sure to stay compliant with your anticoagulation

## 2025-04-22 NOTE — ED PROVIDER NOTES
Subjective   History of Present Illness  74-year-old female presents to the ER chief complaint of palpitations.  Patient is currently denying any chest pain.  States she does have a known history of atrial fibrillation.  Currently anticoagulated with Eliquis.  Admits to compliance with medications.  States she was over here visiting a friend in the [facility] in detox that she was walking up the hill leaving noticed her heart was racing.  This brought her to the ER.  Does have cardiac history.  Denies any shortness of breath.        Review of Systems   Constitutional: Negative.  Negative for fever.   HENT: Negative.     Respiratory: Negative.     Cardiovascular:  Positive for palpitations. Negative for chest pain.   Gastrointestinal: Negative.  Negative for abdominal pain.   Endocrine: Negative.    Genitourinary: Negative.  Negative for dysuria.   Skin: Negative.    Neurological: Negative.    Psychiatric/Behavioral: Negative.     All other systems reviewed and are negative.      Past Medical History:   Diagnosis Date    Arthritis     Breast cancer 2006    right    Disease of thyroid gland     GERD (gastroesophageal reflux disease)     Hypertension     Injury of back     Migraine     Osteoporosis        No Known Allergies    Past Surgical History:   Procedure Laterality Date    BREAST BIOPSY Left 10/2019    benign    MASTECTOMY Right 2006       Family History   Problem Relation Age of Onset    Breast cancer Neg Hx        Social History     Socioeconomic History    Marital status:    Tobacco Use    Smoking status: Former     Types: Cigarettes     Passive exposure: Past    Smokeless tobacco: Never   Vaping Use    Vaping status: Never Used   Substance and Sexual Activity    Alcohol use: Yes     Alcohol/week: 3.0 standard drinks of alcohol     Types: 3 Shots of liquor per week     Comment: occ.    Drug use: Never    Sexual activity: Defer           Objective   Physical Exam  Vitals and nursing note reviewed.    Constitutional:       General: She is not in acute distress.     Appearance: She is well-developed. She is not diaphoretic.   HENT:      Head: Normocephalic and atraumatic.      Right Ear: External ear normal.      Left Ear: External ear normal.      Nose: Nose normal.   Eyes:      Conjunctiva/sclera: Conjunctivae normal.   Neck:      Vascular: No JVD.      Trachea: No tracheal deviation.   Cardiovascular:      Rate and Rhythm: Tachycardia present. Rhythm irregular.      Heart sounds: Normal heart sounds. No murmur heard.     Comments: Atrial fibrillation with rapid ventricular response on auscultation.  Pulmonary:      Effort: Pulmonary effort is normal. No respiratory distress.      Breath sounds: Normal breath sounds. No wheezing.   Abdominal:      Palpations: Abdomen is soft.      Tenderness: There is no abdominal tenderness.   Musculoskeletal:         General: No deformity. Normal range of motion.      Cervical back: Normal range of motion and neck supple.   Skin:     General: Skin is warm and dry.      Coloration: Skin is not pale.      Findings: No erythema or rash.   Neurological:      Mental Status: She is alert and oriented to person, place, and time.      Cranial Nerves: No cranial nerve deficit.   Psychiatric:         Behavior: Behavior normal.         Thought Content: Thought content normal.         Procedures           ED Course  ED Course as of 04/21/25 2310   Mon Apr 21, 2025 2003 ECG demonstrates atrial fibrillation with rapid ventricular rate at 136 bpm.  QRS duration is normal at 119 ms.  QTc is prolonged at 496 ms.  There are no acute ST-T wave changes. [RA]   5440 CXR rad interpreted:  1. Cardiac enlargement with findings suggesting pulmonary edema. [RB]      ED Course User Index  [RA] Anant Herring MD  [RB] Augustus Miller II, PA                                                       Medical Decision Making  Problems Addressed:  Atrial fibrillation with rapid ventricular response:  complicated acute illness or injury    Amount and/or Complexity of Data Reviewed  Labs: ordered.  Radiology: ordered.  ECG/medicine tests: ordered.    Risk  OTC drugs.  Prescription drug management.        Final diagnoses:   Atrial fibrillation with rapid ventricular response       ED Disposition  ED Disposition       ED Disposition   Discharge    Condition   Stable    Comment   --               Keaton Camarillo MD  [address] [address]  [phone]    Go on 5/9/2025           Medication List      No changes were made to your prescriptions during this visit.            Augustus Miller II, PA  04/21/25 0500

## 2025-04-24 ENCOUNTER — HOSPITAL ENCOUNTER (OUTPATIENT)
Facility: HOSPITAL | Age: 75
Discharge: HOME | End: 2025-04-24
Admitting: NURSE PRACTITIONER
Payer: MEDICARE

## 2025-04-24 DIAGNOSIS — Z12.31 SCREENING MAMMOGRAM FOR BREAST CANCER: ICD-10-CM

## 2025-04-24 PROCEDURE — 77067 SCR MAMMO BI INCL CAD: CPT

## 2025-04-24 PROCEDURE — 77067 SCR MAMMO BI INCL CAD: CPT | Performed by: RADIOLOGY

## 2025-04-24 PROCEDURE — 77063 BREAST TOMOSYNTHESIS BI: CPT

## 2025-04-24 PROCEDURE — 77063 BREAST TOMOSYNTHESIS BI: CPT | Performed by: RADIOLOGY

## 2025-05-09 ENCOUNTER — OFFICE VISIT (OUTPATIENT)
Dept: CARDIOLOGY | Facility: CLINIC | Age: 75
End: 2025-05-09
Payer: MEDICARE

## 2025-05-09 VITALS
DIASTOLIC BLOOD PRESSURE: 82 MMHG | SYSTOLIC BLOOD PRESSURE: 140 MMHG | HEIGHT: 67 IN | WEIGHT: 175.6 LBS | OXYGEN SATURATION: 97 % | BODY MASS INDEX: 27.56 KG/M2 | HEART RATE: 66 BPM

## 2025-05-09 DIAGNOSIS — I10 ESSENTIAL HYPERTENSION: Chronic | ICD-10-CM

## 2025-05-09 DIAGNOSIS — I48.0 PAROXYSMAL ATRIAL FIBRILLATION: Primary | Chronic | ICD-10-CM

## 2025-05-09 DIAGNOSIS — R40.0 DAYTIME SOMNOLENCE: ICD-10-CM

## 2025-05-09 DIAGNOSIS — Z01.818 PREOPERATIVE TESTING: ICD-10-CM

## 2025-05-09 PROCEDURE — 3077F SYST BP >= 140 MM HG: CPT

## 2025-05-09 PROCEDURE — 1160F RVW MEDS BY RX/DR IN RCRD: CPT

## 2025-05-09 PROCEDURE — 3079F DIAST BP 80-89 MM HG: CPT

## 2025-05-09 PROCEDURE — 1159F MED LIST DOCD IN RCRD: CPT

## 2025-05-09 NOTE — PROGRESS NOTES
May 9, 2025    Hello, may I speak with Luna Peck?    My name is JOSELIN      I am  with [facility]  [facility]  [address]  [place] [address] [phone].    Before we get started may I verify your date of birth? 1950    I am calling to officially welcome you to our practice and ask about your recent visit. Is this a good time to talk? yes    Tell me about your visit with us. What things went well?  EVERY ONE WAS NICE SHE WAS HAPPY WITH THE APPT       We're always looking for ways to make our patients' experiences even better. Do you have recommendations on ways we may improve?  no    Overall were you satisfied with your first visit to our practice? yes       I appreciate you taking the time to speak with me today. Is there anything else I can do for you? no      Thank you, and have a great day.

## 2025-05-13 PROBLEM — Z01.818 PREOPERATIVE TESTING: Status: ACTIVE | Noted: 2025-05-13

## 2025-05-13 PROBLEM — R40.0 DAYTIME SOMNOLENCE: Status: ACTIVE | Noted: 2025-05-13

## 2025-06-09 ENCOUNTER — OFFICE VISIT (OUTPATIENT)
Dept: CARDIOLOGY | Facility: CLINIC | Age: 75
End: 2025-06-09
Payer: MEDICARE

## 2025-06-09 VITALS
DIASTOLIC BLOOD PRESSURE: 83 MMHG | SYSTOLIC BLOOD PRESSURE: 145 MMHG | BODY MASS INDEX: 28.12 KG/M2 | OXYGEN SATURATION: 97 % | HEART RATE: 52 BPM | HEIGHT: 67 IN | WEIGHT: 179.2 LBS

## 2025-06-09 DIAGNOSIS — I10 ESSENTIAL HYPERTENSION: ICD-10-CM

## 2025-06-09 DIAGNOSIS — I48.0 PAROXYSMAL ATRIAL FIBRILLATION: Primary | ICD-10-CM

## 2025-06-09 PROCEDURE — 3077F SYST BP >= 140 MM HG: CPT | Performed by: INTERNAL MEDICINE

## 2025-06-09 PROCEDURE — 1160F RVW MEDS BY RX/DR IN RCRD: CPT | Performed by: INTERNAL MEDICINE

## 2025-06-09 PROCEDURE — 3079F DIAST BP 80-89 MM HG: CPT | Performed by: INTERNAL MEDICINE

## 2025-06-09 PROCEDURE — 99213 OFFICE O/P EST LOW 20 MIN: CPT | Performed by: INTERNAL MEDICINE

## 2025-06-09 PROCEDURE — 1159F MED LIST DOCD IN RCRD: CPT | Performed by: INTERNAL MEDICINE

## 2025-06-09 RX ORDER — LOSARTAN POTASSIUM 25 MG/1
50 TABLET ORAL DAILY
Qty: 30 TABLET | Refills: 12 | Status: SHIPPED | OUTPATIENT
Start: 2025-06-09

## 2025-06-09 RX ORDER — OMEPRAZOLE 20 MG/1
CAPSULE, DELAYED RELEASE ORAL
COMMUNITY
Start: 2025-05-23

## 2025-06-09 RX ORDER — HYDRALAZINE HYDROCHLORIDE 10 MG/1
10 TABLET, FILM COATED ORAL 3 TIMES DAILY
COMMUNITY
Start: 2025-05-23

## 2025-06-09 NOTE — PROGRESS NOTES
Office Note    Subjective     Luna Peck is a 74 y.o. female who presents to day for follow-up      Active Problems:  Problem List Items Addressed This Visit          Cardiac and Vasculature    Paroxysmal atrial fibrillation - Primary    Essential hypertension    Relevant Medications    hydrALAZINE (APRESOLINE) 10 MG tablet    losartan (Cozaar) 25 MG tablet         HPI    Luna Peck comes in for follow-up.  She was in the emergency room on 4/21/2025 with A-fib RVR.  She developed this when she was visiting somebody in the hospital.  She has not had any recurrence since.  She was seen by EP and scheduled for ablation sometime in July.  She also is scheduled to have a sleep study done in August.  She otherwise denies any dizziness or syncopal episodes.  She denies any exertional chest pain or shortness of breath.  She lives with her landlord and takes care of him.  She denies any bleeding issues.      12/5/2024  Patient is a pleasant 74-year-old female past medical history significant for hypertension, paroxysmal atrial fibrillation in for routine follow-up.  Patient has been having frequent episodes of paroxysmal A-fib with heart rate going in 140s 150s.  She has been recently cooking and getting ready for the festivities and did not get enough sleep the next day had episodes of heart rate speed up in 150s range.  She takes metoprolol as needed only.  Her heart rate at baseline is 49 today.  Her blood pressure is high also.    PRIOR MEDS  Current Outpatient Medications on File Prior to Visit   Medication Sig Dispense Refill    alendronate (FOSAMAX) 70 MG tablet Take 1 tablet by mouth Every 7 (Seven) Days.      bumetanide (BUMEX) 1 MG tablet Take 1 tablet by mouth Daily As Needed (swelling).      cetirizine (zyrTEC) 10 MG tablet Take 1 tablet by mouth Every Night.      Diclofenac Sodium (VOLTAREN) 1 % gel gel As Needed.      Docusate Sodium (COLACE PO) Take  by mouth 2 (Two) Times a Day.      Eliquis 5 MG  tablet tablet TAKE ONE TABLET BY MOUTH TWICE DAILY 60 tablet 12    fluticasone (FLONASE) 50 MCG/ACT nasal spray Administer 2 sprays into the nostril(s) as directed by provider Daily.      hydrALAZINE (APRESOLINE) 10 MG tablet Take 1 tablet by mouth 3 (Three) Times a Day.      hydroCHLOROthiazide 25 MG tablet TAKE ONE TABLET BY MOUTH ONCE DAILY 30 tablet 11    HYDROcodone-acetaminophen (NORCO) 7.5-325 MG per tablet Take 1 tablet by mouth Daily As Needed for Moderate Pain.      levothyroxine (SYNTHROID, LEVOTHROID) 137 MCG tablet Take 1 tablet by mouth Daily.      omeprazole (priLOSEC) 20 MG capsule       potassium chloride 10 MEQ CR tablet Take 1 tablet by mouth Daily.      vitamin D (ERGOCALCIFEROL) 1.25 MG (04741 UT) capsule capsule Take 1 capsule by mouth 1 (One) Time Per Week. Takes Sundays      [DISCONTINUED] losartan (Cozaar) 25 MG tablet Take 1 tablet by mouth Daily. 30 tablet 12    [DISCONTINUED] metoprolol tartrate (LOPRESSOR) 25 MG tablet Take 1 tablet by mouth Daily As Needed (for heart rate more than 120). (Patient not taking: Reported on 6/9/2025) 180 tablet 3     No current facility-administered medications on file prior to visit.       ALLERGIES  Patient has no known allergies.    HISTORY  Past Medical History:   Diagnosis Date    Arthritis     Breast cancer 2006    right    Disease of thyroid gland     GERD (gastroesophageal reflux disease)     Hypertension     Injury of back     Migraine     Osteoporosis        Social History     Socioeconomic History    Marital status:    Tobacco Use    Smoking status: Former     Types: Cigarettes     Passive exposure: Past    Smokeless tobacco: Never   Vaping Use    Vaping status: Never Used   Substance and Sexual Activity    Alcohol use: Yes     Alcohol/week: 3.0 standard drinks of alcohol     Types: 3 Shots of liquor per week     Comment: occ.    Drug use: Never    Sexual activity: Defer       Family History   Problem Relation Age of Onset    Atrial  "fibrillation Mother     Heart attack Father     Diabetes Sister     Heart attack Sister     Thyroid cancer Sister     Lung cancer Sister     Breast cancer Neg Hx        Review of Systems   Respiratory:  Negative for chest tightness and shortness of breath.    Cardiovascular:  Positive for palpitations. Negative for chest pain and leg swelling.   Neurological:  Positive for dizziness and light-headedness. Negative for seizures, syncope and headaches.       Objective     VITALS: /83 (BP Location: Left arm, Patient Position: Sitting, Cuff Size: Adult)   Pulse 52   Ht 168.9 cm (66.5\")   Wt 81.3 kg (179 lb 3.2 oz)   SpO2 97%   BMI 28.49 kg/m²     LABS:   Admission on 04/21/2025, Discharged on 04/21/2025   Component Date Value Ref Range Status    QT Interval 04/21/2025 330  ms Final    QTC Interval 04/21/2025 496  ms Final    Glucose 04/21/2025 111 (H)  65 - 99 mg/dL Final    BUN 04/21/2025 19  8 - 23 mg/dL Final    Creatinine 04/21/2025 1.13 (H)  0.57 - 1.00 mg/dL Final    Sodium 04/21/2025 142  136 - 145 mmol/L Final    Potassium 04/21/2025 3.2 (L)  3.5 - 5.2 mmol/L Final    Slight hemolysis detected by analyzer. Result may be falsely elevated.    Chloride 04/21/2025 104  98 - 107 mmol/L Final    CO2 04/21/2025 26.6  22.0 - 29.0 mmol/L Final    Calcium 04/21/2025 10.1  8.6 - 10.5 mg/dL Final    Total Protein 04/21/2025 7.8  6.0 - 8.5 g/dL Final    Albumin 04/21/2025 4.3  3.5 - 5.2 g/dL Final    ALT (SGPT) 04/21/2025 15  1 - 33 U/L Final    AST (SGOT) 04/21/2025 21  1 - 32 U/L Final    Alkaline Phosphatase 04/21/2025 68  39 - 117 U/L Final    Total Bilirubin 04/21/2025 0.3  0.0 - 1.2 mg/dL Final    Globulin 04/21/2025 3.5  gm/dL Final    A/G Ratio 04/21/2025 1.2  g/dL Final    BUN/Creatinine Ratio 04/21/2025 16.8  7.0 - 25.0 Final    Anion Gap 04/21/2025 11.4  5.0 - 15.0 mmol/L Final    eGFR 04/21/2025 50.8 (L)  >60.0 mL/min/1.73 Final    HS Troponin T 04/21/2025 12  <14 ng/L Final    Extra Tube 04/21/2025 " Hold for add-ons.   Final    Auto resulted.    Extra Tube 04/21/2025 hold for add-on   Final    Auto resulted    Extra Tube 04/21/2025 Hold for add-ons.   Final    Auto resulted.    Extra Tube 04/21/2025 Hold for add-ons.   Final    Auto resulted    WBC 04/21/2025 6.50  3.40 - 10.80 10*3/mm3 Final    RBC 04/21/2025 4.56  3.77 - 5.28 10*6/mm3 Final    Hemoglobin 04/21/2025 12.5  12.0 - 15.9 g/dL Final    Hematocrit 04/21/2025 38.1  34.0 - 46.6 % Final    MCV 04/21/2025 83.6  79.0 - 97.0 fL Final    MCH 04/21/2025 27.4  26.6 - 33.0 pg Final    MCHC 04/21/2025 32.8  31.5 - 35.7 g/dL Final    RDW 04/21/2025 12.6  12.3 - 15.4 % Final    RDW-SD 04/21/2025 38.3  37.0 - 54.0 fl Final    MPV 04/21/2025 10.0  6.0 - 12.0 fL Final    Platelets 04/21/2025 278  140 - 450 10*3/mm3 Final    Neutrophil % 04/21/2025 50.5  42.7 - 76.0 % Final    Lymphocyte % 04/21/2025 32.8  19.6 - 45.3 % Final    Monocyte % 04/21/2025 7.5  5.0 - 12.0 % Final    Eosinophil % 04/21/2025 8.5 (H)  0.3 - 6.2 % Final    Basophil % 04/21/2025 0.5  0.0 - 1.5 % Final    Immature Grans % 04/21/2025 0.2  0.0 - 0.5 % Final    Neutrophils, Absolute 04/21/2025 3.29  1.70 - 7.00 10*3/mm3 Final    Lymphocytes, Absolute 04/21/2025 2.13  0.70 - 3.10 10*3/mm3 Final    Monocytes, Absolute 04/21/2025 0.49  0.10 - 0.90 10*3/mm3 Final    Eosinophils, Absolute 04/21/2025 0.55 (H)  0.00 - 0.40 10*3/mm3 Final    Basophils, Absolute 04/21/2025 0.03  0.00 - 0.20 10*3/mm3 Final    Immature Grans, Absolute 04/21/2025 0.01  0.00 - 0.05 10*3/mm3 Final    nRBC 04/21/2025 0.0  0.0 - 0.2 /100 WBC Final    proBNP 04/21/2025 313.3  0.0 - 1,800.0 pg/mL Final    C-Reactive Protein 04/21/2025 <0.30  0.00 - 0.50 mg/dL Final    Creatine Kinase 04/21/2025 166  20 - 180 U/L Final    Magnesium 04/21/2025 2.0  1.6 - 2.4 mg/dL Final    TSH 04/21/2025 2.540  0.270 - 4.200 uIU/mL Final    HS Troponin T 04/21/2025 15 (H)  <14 ng/L Final    Troponin T Numeric Delta 04/21/2025 3 (C)  Abnormal if >/=3  ng/L Final    HS Troponin T 04/21/2025 12  <14 ng/L Final         IMAGING:   No Images in the past 120 days found..  Results for orders placed during the hospital encounter of 07/06/23    Stress Test With Myocardial Perfusion One Day    Interpretation Summary    Left ventricular ejection fraction is normal.    Myocardial perfusion imaging indicates a normal myocardial perfusion study with no evidence of ischemia.    Impressions are consistent with a low risk study.    TID 0.91.    Findings consistent with a normal ECG stress test.     No results found for this or any previous visit.          EXAM:  Constitutional:       General: Awake.      Appearance: Healthy appearance. Not in distress.   HENT:      Head: Normocephalic and atraumatic.   Neck:      Vascular: No JVD.   Pulmonary:      Effort: Pulmonary effort is normal.      Breath sounds: Normal breath sounds.   Chest:      Chest wall: Not tender to palpatation.   Cardiovascular:      Normal rate. Regular rhythm. Normal S1 with normal intensity. Normal S2 with normal intensity.       Murmurs: There is no murmur.      No gallop.  No rub.   Pulses:     Intact distal pulses.   Edema:     Peripheral edema absent.   Abdominal:      Palpations: Abdomen is soft.      Tenderness: There is no abdominal tenderness.   Skin:     General: Skin is warm and dry.   Neurological:      Mental Status: Alert and oriented to person, place and time.          Procedure   Procedures       Assessment & Plan     Diagnoses and all orders for this visit:    1. Paroxysmal atrial fibrillation (Primary)    2. Essential hypertension    Other orders  -     losartan (Cozaar) 25 MG tablet; Take 2 tablets by mouth Daily.  Dispense: 30 tablet; Refill: 12            PLAN  - Continue Bumex, Eliquis, hydralazine, hydrochlorothiazide and potassium.  - Increase Cozaar to 50 mg p.o. daily.  - Follow-up with EP for ablation in July.  - Sleep study as scheduled in August.  - Follow a good diet and exercise  program.  - RTC in 6 months.         MEDS ORDERED DURING VISIT:    Medications Discontinued During This Encounter   Medication Reason    metoprolol tartrate (LOPRESSOR) 25 MG tablet *Therapy completed    losartan (Cozaar) 25 MG tablet           New Medications Ordered This Visit   Medications    losartan (Cozaar) 25 MG tablet     Sig: Take 2 tablets by mouth Daily.     Dispense:  30 tablet     Refill:  12         Follow Up:   No follow-ups on file.    Patient was given instructions and counseling regarding her condition or for health maintenance advice. Please see specific information pulled into the AVS if appropriate.   As always, Tamara Ramirez APRN  I appreciate very much the opportunity to participate in the cardiovascular care of your patients. Please do not hesitate to call me with any questions with regards to Luna Peck evaluation and management.         This document has been electronically signed by Araceli Loza MD [facility], Interventional Cardiology  June 9, 2025 15:15 EDT    Dictated Utilizing Dragon Dictation: Part of this note may be an electronic transcription/translation of spoken language to printed text using the Dragon Dictation System.

## 2025-06-12 ENCOUNTER — PREP FOR SURGERY (OUTPATIENT)
Dept: OTHER | Facility: HOSPITAL | Age: 75
End: 2025-06-12
Payer: MEDICARE

## 2025-06-12 DIAGNOSIS — I48.0 PAROXYSMAL ATRIAL FIBRILLATION: Primary | ICD-10-CM

## 2025-06-12 RX ORDER — SODIUM CHLORIDE 9 MG/ML
40 INJECTION, SOLUTION INTRAVENOUS AS NEEDED
OUTPATIENT
Start: 2025-06-12

## 2025-06-12 RX ORDER — ACETAMINOPHEN 325 MG/1
650 TABLET ORAL EVERY 4 HOURS PRN
OUTPATIENT
Start: 2025-06-12

## 2025-06-12 RX ORDER — ONDANSETRON 2 MG/ML
4 INJECTION INTRAMUSCULAR; INTRAVENOUS EVERY 6 HOURS PRN
OUTPATIENT
Start: 2025-06-12

## 2025-06-12 RX ORDER — NITROGLYCERIN 0.4 MG/1
0.4 TABLET SUBLINGUAL
OUTPATIENT
Start: 2025-06-12

## 2025-06-19 NOTE — NURSING NOTE
PRE-PVA ASSESSMENT  Luna Peck 1950   [address] [place] [place] [address]   [phone]        Referral Source: Nahum Deutsch MD   Information obtained from: [x] Medical record review  [x] Patient report  Scheduled for: PVA on 7/14/2025 with Dr. Camarillo  No Known Allergies    AFib Specific History:  AFIBTYPE: paroxysmal    CHADS-VASc Risk Assessment               4 Total Score    1 Hypertension    2 Age >/= 75    1 Sex: Female    Criteria that do not apply:    CHF    DM    PRIOR STROKE/TIA/THROMBO    Vascular Disease    Age 65-74            Anticoagulation: Eliquis 5 mg BID, NO missed doses.   Cardioversion x 0  Failed AAD(s): 0  Prior Ablation: 0    Is Ms. Peck aware of her AFib? Yes   Onset: 2023   Exacerbations: none   Frequency: 2 since dx   Alleviations: none    Duration: minutes      Symptoms:   [x] Palpitations:    [] Chest Discomfort:    [x] Dizziness:    [] Presyncope:    [] Lightheadedness:   [] Syncope:    [x] Fatigue:    [x] Other: weakness    [x] Short of Breath:     Last Echo(s):  [x] TTE Date:   Results for orders placed during the hospital encounter of 07/06/23    Adult Transthoracic Echo Complete W/ Cont if Necessary Per Protocol    Interpretation Summary    Left ventricular systolic function is normal. Calculated left ventricular EF = 67% Left ventricular ejection fraction appears to be 66 - 70%.    Left ventricular wall thickness is consistent with mild to moderate concentric hypertrophy.    Left ventricular diastolic function was normal.    Estimated right ventricular systolic pressure from tricuspid regurgitation is normal (<35 mmHg).    Mild dilation of the aortic root is present.       Past medical History:     [] Diabetes  NO            [x] HYPOthyroidism  [] HYPERthyroidism NO  Tx: synthroid          TSH   Date Value Ref Range Status   04/21/2025 2.540 0.270 - 4.200 uIU/mL Final   03/03/2024 2.030 0.270 - 4.200 uIU/mL Final       [x] HTN        [x] Tx: HCTZ, Losartan,  metoprolol       [] Heart Failure  NO  [] CVA   NO                            [] TIA NO     [] CAD     NO    [] MI  NO          [] Dyslipidemia NO  [] Statin indicated    [x] Ischemic Evaluation       [x] Stress Test: 2023   Left ventricular ejection fraction is normal.    Myocardial perfusion imaging indicates a normal myocardial perfusion study with no evidence of ischemia.    Impressions are consistent with a low risk study.    TID 0.91.    Findings consistent with a normal ECG stress test.    [x] Sleep Apnea Suspected        [x] Discussed with Ms. Peck         [x] Referral to [facility] sleep medicine pending.       [] Obesity NO      Other Pertinent PMH:     STOP the following medication(s) as indicated:    No medications to hold. Take last dose on:     Summary of Patient Contact:    I spoke with Ms. Peck about her upcoming PVA.   She was well informed about the procedure from prior discussion with Dr. Camarillo and from reading the provided literature. We discussed the procedure at length including risks, anesthesia, intra-op procedures, recovery, bedrest, normal post-procedure expectations, and success rates. Patient was advised to call with any medication changes, new or worsening symptoms, or changes to their medical plan of care. I answered a few remaining questions. Ms. Peck verbalized understanding and she is ready to proceed.

## 2025-07-08 ENCOUNTER — HOSPITAL ENCOUNTER (OUTPATIENT)
Dept: CT IMAGING | Facility: HOSPITAL | Age: 75
Discharge: HOME | End: 2025-07-08
Payer: MEDICARE

## 2025-07-08 ENCOUNTER — PRE-ADMISSION TESTING (OUTPATIENT)
Dept: PREADMISSION TESTING | Facility: HOSPITAL | Age: 75
End: 2025-07-08
Payer: MEDICARE

## 2025-07-08 DIAGNOSIS — I48.0 PAROXYSMAL ATRIAL FIBRILLATION: Chronic | ICD-10-CM

## 2025-07-08 DIAGNOSIS — Z01.818 PREOPERATIVE TESTING: ICD-10-CM

## 2025-07-08 DIAGNOSIS — I48.0 PAROXYSMAL ATRIAL FIBRILLATION: ICD-10-CM

## 2025-07-08 LAB
ANION GAP SERPL CALCULATED.3IONS-SCNC: 8.6 MMOL/L (ref 5–15)
BUN SERPL-MCNC: 21.1 MG/DL (ref 8–23)
BUN/CREAT SERPL: 23.2 (ref 7–25)
CALCIUM SPEC-SCNC: 9.7 MG/DL (ref 8.6–10.5)
CHLORIDE SERPL-SCNC: 108 MMOL/L (ref 98–107)
CO2 SERPL-SCNC: 26.4 MMOL/L (ref 22–29)
CREAT SERPL-MCNC: 0.91 MG/DL (ref 0.57–1)
DEPRECATED RDW RBC AUTO: 39.5 FL (ref 37–54)
EGFRCR SERPLBLD CKD-EPI 2021: 65.9 ML/MIN/1.73
ERYTHROCYTE [DISTWIDTH] IN BLOOD BY AUTOMATED COUNT: 13 % (ref 12.3–15.4)
GLUCOSE SERPL-MCNC: 104 MG/DL (ref 65–99)
HCT VFR BLD AUTO: 36 % (ref 34–46.6)
HGB BLD-MCNC: 11.8 G/DL (ref 12–15.9)
MCH RBC QN AUTO: 27.2 PG (ref 26.6–33)
MCHC RBC AUTO-ENTMCNC: 32.8 G/DL (ref 31.5–35.7)
MCV RBC AUTO: 82.9 FL (ref 79–97)
PLATELET # BLD AUTO: 248 10*3/MM3 (ref 140–450)
PMV BLD AUTO: 10.5 FL (ref 6–12)
POTASSIUM SERPL-SCNC: 4.4 MMOL/L (ref 3.5–5.2)
RBC # BLD AUTO: 4.34 10*6/MM3 (ref 3.77–5.28)
SODIUM SERPL-SCNC: 143 MMOL/L (ref 136–145)
WBC NRBC COR # BLD AUTO: 5.43 10*3/MM3 (ref 3.4–10.8)

## 2025-07-08 PROCEDURE — 25510000001 IOPAMIDOL PER 1 ML

## 2025-07-08 PROCEDURE — 85027 COMPLETE CBC AUTOMATED: CPT

## 2025-07-08 PROCEDURE — 71275 CT ANGIOGRAPHY CHEST: CPT

## 2025-07-08 PROCEDURE — 80048 BASIC METABOLIC PNL TOTAL CA: CPT

## 2025-07-08 PROCEDURE — 36415 COLL VENOUS BLD VENIPUNCTURE: CPT

## 2025-07-08 RX ORDER — IOPAMIDOL 755 MG/ML
85 INJECTION, SOLUTION INTRAVASCULAR
Status: COMPLETED | OUTPATIENT
Start: 2025-07-08 | End: 2025-07-08

## 2025-07-08 RX ORDER — FOLIC ACID 1 MG/1
1 TABLET ORAL DAILY
COMMUNITY

## 2025-07-08 RX ADMIN — IOPAMIDOL 85 ML: 755 INJECTION, SOLUTION INTRAVENOUS at 13:50

## 2025-07-08 NOTE — DISCHARGE INSTRUCTIONS
Dear Patient,    Do NOT eat, drink, or smoke after midnight the night before your procedure.   Take your medications as instructed by your doctor.    Glasses and jewelry may be worn, but dentures must be removed prior to your procedure.    Leave any items you consider valuable at home.      MORNING of your Procedure, please bring the following:     -Photo ID and insurance card(s)    -ALL medications in their ORIGINAL CONTAINERS or current medication list.    -Co-pay and/or deductible required by your insurance   -Copy of living will or power of  document (if not brought to Pre-Admission Testing department)   -CPAP mask and tubing, not your machine (if applicable)   -Relaxation aids (music, books, magazines)   -Skin Prep Instruction Sheet (if applicable)       Check in is on the 2nd floor of the [address] building.  Your procedure will be performed in the cath lab or EP lab.  During your procedure, your family will wait in the cath lab waiting area where you checked in.      Please make arrangements for transportation home prior to discharge time.      Please note:  If you are scheduled to have one of the following procedures: Pulmonary Vein Ablation, Lead Extraction, MitraClip, Cerebral Coilings or Embolization, please let your family know that after your procedure you will be going to recovery unit on the 2nd floor of the [address] building.  When the physician is finished speaking with your family after your procedure is completed, your family will be directed or escorted to the surgery waiting area in the [facility].  This is where your family will wait until you are given a room assignment and then your family will be directed to the appropriate unit.

## 2025-07-10 ENCOUNTER — TELEPHONE (OUTPATIENT)
Dept: CARDIOLOGY | Facility: CLINIC | Age: 75
End: 2025-07-10
Payer: MEDICARE

## 2025-07-10 NOTE — TELEPHONE ENCOUNTER
Patient contacted to review upcoming PVA scheduled with Dr. Camarillo. Patient reports uninterrupted therapy with Eliquis. No changes to plan of care reported. All questions answered at this time. Patient encouraged to reach out with any questions or concerns post PVA. Patient verbalized understanding.

## 2025-07-13 ENCOUNTER — ANESTHESIA EVENT (OUTPATIENT)
Dept: CARDIOLOGY | Facility: HOSPITAL | Age: 75
End: 2025-07-13
Payer: MEDICARE

## 2025-07-14 ENCOUNTER — ANESTHESIA (OUTPATIENT)
Dept: CARDIOLOGY | Facility: HOSPITAL | Age: 75
End: 2025-07-14
Payer: MEDICARE

## 2025-07-14 ENCOUNTER — HOSPITAL ENCOUNTER (OUTPATIENT)
Facility: HOSPITAL | Age: 75
Setting detail: HOSPITAL OUTPATIENT SURGERY
Discharge: HOME | End: 2025-07-14
Attending: STUDENT IN AN ORGANIZED HEALTH CARE EDUCATION/TRAINING PROGRAM | Admitting: STUDENT IN AN ORGANIZED HEALTH CARE EDUCATION/TRAINING PROGRAM
Payer: MEDICARE

## 2025-07-14 VITALS
BODY MASS INDEX: 29.12 KG/M2 | TEMPERATURE: 97.7 F | DIASTOLIC BLOOD PRESSURE: 76 MMHG | WEIGHT: 181.22 LBS | OXYGEN SATURATION: 91 % | HEIGHT: 66 IN | HEART RATE: 63 BPM | RESPIRATION RATE: 13 BRPM | SYSTOLIC BLOOD PRESSURE: 153 MMHG

## 2025-07-14 DIAGNOSIS — I48.0 PAROXYSMAL ATRIAL FIBRILLATION: ICD-10-CM

## 2025-07-14 LAB
ACT BLD: 389 SECONDS (ref 82–152)
ACT BLD: 429 SECONDS (ref 82–152)

## 2025-07-14 PROCEDURE — 85347 COAGULATION TIME ACTIVATED: CPT

## 2025-07-14 PROCEDURE — C1730 CATH, EP, 19 OR FEW ELECT: HCPCS | Performed by: STUDENT IN AN ORGANIZED HEALTH CARE EDUCATION/TRAINING PROGRAM

## 2025-07-14 PROCEDURE — 25010000002 PROPOFOL 10 MG/ML EMULSION: Performed by: NURSE ANESTHETIST, CERTIFIED REGISTERED

## 2025-07-14 PROCEDURE — C1759 CATH, INTRA ECHOCARDIOGRAPHY: HCPCS | Performed by: STUDENT IN AN ORGANIZED HEALTH CARE EDUCATION/TRAINING PROGRAM

## 2025-07-14 PROCEDURE — C1894 INTRO/SHEATH, NON-LASER: HCPCS | Performed by: STUDENT IN AN ORGANIZED HEALTH CARE EDUCATION/TRAINING PROGRAM

## 2025-07-14 PROCEDURE — C1766 INTRO/SHEATH,STRBLE,NON-PEEL: HCPCS | Performed by: STUDENT IN AN ORGANIZED HEALTH CARE EDUCATION/TRAINING PROGRAM

## 2025-07-14 PROCEDURE — 93656 COMPRE EP EVAL ABLTJ ATR FIB: CPT | Performed by: STUDENT IN AN ORGANIZED HEALTH CARE EDUCATION/TRAINING PROGRAM

## 2025-07-14 PROCEDURE — C1760 CLOSURE DEV, VASC: HCPCS | Performed by: STUDENT IN AN ORGANIZED HEALTH CARE EDUCATION/TRAINING PROGRAM

## 2025-07-14 PROCEDURE — C1732 CATH, EP, DIAG/ABL, 3D/VECT: HCPCS | Performed by: STUDENT IN AN ORGANIZED HEALTH CARE EDUCATION/TRAINING PROGRAM

## 2025-07-14 PROCEDURE — 25010000002 PROTAMINE SULFATE PER 10 MG: Performed by: STUDENT IN AN ORGANIZED HEALTH CARE EDUCATION/TRAINING PROGRAM

## 2025-07-14 PROCEDURE — 25010000002 HEPARIN (PORCINE) PER 1000 UNITS: Performed by: STUDENT IN AN ORGANIZED HEALTH CARE EDUCATION/TRAINING PROGRAM

## 2025-07-14 PROCEDURE — 25010000002 LIDOCAINE 1 % SOLUTION: Performed by: STUDENT IN AN ORGANIZED HEALTH CARE EDUCATION/TRAINING PROGRAM

## 2025-07-14 PROCEDURE — 25010000002 BUPIVACAINE 0.5 % SOLUTION: Performed by: STUDENT IN AN ORGANIZED HEALTH CARE EDUCATION/TRAINING PROGRAM

## 2025-07-14 PROCEDURE — 25010000002 ONDANSETRON PER 1 MG: Performed by: NURSE ANESTHETIST, CERTIFIED REGISTERED

## 2025-07-14 PROCEDURE — 25810000003 SODIUM CHLORIDE 0.9 % SOLUTION 250 ML FLEX CONT: Performed by: NURSE ANESTHETIST, CERTIFIED REGISTERED

## 2025-07-14 PROCEDURE — 25010000002 LIDOCAINE 1 % SOLUTION: Performed by: NURSE ANESTHETIST, CERTIFIED REGISTERED

## 2025-07-14 PROCEDURE — 25010000002 PHENYLEPHRINE 10 MG/ML SOLUTION 1 ML VIAL: Performed by: NURSE ANESTHETIST, CERTIFIED REGISTERED

## 2025-07-14 PROCEDURE — 93657 TX L/R ATRIAL FIB ADDL: CPT | Performed by: STUDENT IN AN ORGANIZED HEALTH CARE EDUCATION/TRAINING PROGRAM

## 2025-07-14 PROCEDURE — C1733 CATH, EP, OTHR THAN COOL-TIP: HCPCS | Performed by: STUDENT IN AN ORGANIZED HEALTH CARE EDUCATION/TRAINING PROGRAM

## 2025-07-14 PROCEDURE — 25810000003 SODIUM CHLORIDE 0.9 % SOLUTION: Performed by: NURSE ANESTHETIST, CERTIFIED REGISTERED

## 2025-07-14 PROCEDURE — 25010000002 DEXAMETHASONE PER 1 MG: Performed by: NURSE ANESTHETIST, CERTIFIED REGISTERED

## 2025-07-14 PROCEDURE — 25010000002 SUGAMMADEX 200 MG/2ML SOLUTION: Performed by: NURSE ANESTHETIST, CERTIFIED REGISTERED

## 2025-07-14 RX ORDER — ACETAMINOPHEN 325 MG/1
650 TABLET ORAL EVERY 4 HOURS PRN
Status: DISCONTINUED | OUTPATIENT
Start: 2025-07-14 | End: 2025-07-14 | Stop reason: HOSPADM

## 2025-07-14 RX ORDER — DEXAMETHASONE SODIUM PHOSPHATE 4 MG/ML
INJECTION, SOLUTION INTRA-ARTICULAR; INTRALESIONAL; INTRAMUSCULAR; INTRAVENOUS; SOFT TISSUE AS NEEDED
Status: DISCONTINUED | OUTPATIENT
Start: 2025-07-14 | End: 2025-07-14 | Stop reason: SURG

## 2025-07-14 RX ORDER — PROTAMINE SULFATE 10 MG/ML
INJECTION, SOLUTION INTRAVENOUS
Status: DISCONTINUED | OUTPATIENT
Start: 2025-07-14 | End: 2025-07-14 | Stop reason: HOSPADM

## 2025-07-14 RX ORDER — HEPARIN SODIUM 1000 [USP'U]/ML
INJECTION, SOLUTION INTRAVENOUS; SUBCUTANEOUS
Status: DISCONTINUED | OUTPATIENT
Start: 2025-07-14 | End: 2025-07-14 | Stop reason: HOSPADM

## 2025-07-14 RX ORDER — ACETAMINOPHEN 650 MG/1
650 SUPPOSITORY RECTAL EVERY 4 HOURS PRN
Status: DISCONTINUED | OUTPATIENT
Start: 2025-07-14 | End: 2025-07-14 | Stop reason: HOSPADM

## 2025-07-14 RX ORDER — BUPIVACAINE HYDROCHLORIDE 5 MG/ML
INJECTION, SOLUTION PERINEURAL
Status: DISCONTINUED | OUTPATIENT
Start: 2025-07-14 | End: 2025-07-14 | Stop reason: HOSPADM

## 2025-07-14 RX ORDER — SODIUM CHLORIDE 0.9 % (FLUSH) 0.9 %
10 SYRINGE (ML) INJECTION AS NEEDED
Status: DISCONTINUED | OUTPATIENT
Start: 2025-07-14 | End: 2025-07-14 | Stop reason: HOSPADM

## 2025-07-14 RX ORDER — SODIUM CHLORIDE 0.9 % (FLUSH) 0.9 %
3 SYRINGE (ML) INJECTION EVERY 12 HOURS SCHEDULED
Status: DISCONTINUED | OUTPATIENT
Start: 2025-07-14 | End: 2025-07-14 | Stop reason: HOSPADM

## 2025-07-14 RX ORDER — LIDOCAINE HYDROCHLORIDE 10 MG/ML
INJECTION, SOLUTION INFILTRATION; PERINEURAL
Status: DISCONTINUED | OUTPATIENT
Start: 2025-07-14 | End: 2025-07-14 | Stop reason: HOSPADM

## 2025-07-14 RX ORDER — FAMOTIDINE 10 MG/ML
20 INJECTION, SOLUTION INTRAVENOUS ONCE
Status: DISCONTINUED | OUTPATIENT
Start: 2025-07-14 | End: 2025-07-14 | Stop reason: HOSPADM

## 2025-07-14 RX ORDER — METOPROLOL SUCCINATE 25 MG/1
25 TABLET, EXTENDED RELEASE ORAL AS NEEDED
COMMUNITY
End: 2025-07-14 | Stop reason: HOSPADM

## 2025-07-14 RX ORDER — LIDOCAINE HYDROCHLORIDE 10 MG/ML
INJECTION, SOLUTION INFILTRATION; PERINEURAL AS NEEDED
Status: DISCONTINUED | OUTPATIENT
Start: 2025-07-14 | End: 2025-07-14 | Stop reason: SURG

## 2025-07-14 RX ORDER — NITROGLYCERIN 0.4 MG/1
0.4 TABLET SUBLINGUAL
Status: DISCONTINUED | OUTPATIENT
Start: 2025-07-14 | End: 2025-07-14 | Stop reason: HOSPADM

## 2025-07-14 RX ORDER — SODIUM CHLORIDE 9 MG/ML
40 INJECTION, SOLUTION INTRAVENOUS AS NEEDED
Status: DISCONTINUED | OUTPATIENT
Start: 2025-07-14 | End: 2025-07-14 | Stop reason: HOSPADM

## 2025-07-14 RX ORDER — FAMOTIDINE 20 MG/1
20 TABLET, FILM COATED ORAL ONCE
Status: COMPLETED | OUTPATIENT
Start: 2025-07-14 | End: 2025-07-14

## 2025-07-14 RX ORDER — SODIUM CHLORIDE 0.9 % (FLUSH) 0.9 %
10 SYRINGE (ML) INJECTION EVERY 12 HOURS SCHEDULED
Status: DISCONTINUED | OUTPATIENT
Start: 2025-07-14 | End: 2025-07-14 | Stop reason: HOSPADM

## 2025-07-14 RX ORDER — LIDOCAINE HYDROCHLORIDE 10 MG/ML
0.5 INJECTION, SOLUTION EPIDURAL; INFILTRATION; INTRACAUDAL; PERINEURAL ONCE AS NEEDED
Status: DISCONTINUED | OUTPATIENT
Start: 2025-07-14 | End: 2025-07-14 | Stop reason: HOSPADM

## 2025-07-14 RX ORDER — ONDANSETRON 2 MG/ML
4 INJECTION INTRAMUSCULAR; INTRAVENOUS EVERY 6 HOURS PRN
Status: DISCONTINUED | OUTPATIENT
Start: 2025-07-14 | End: 2025-07-14 | Stop reason: HOSPADM

## 2025-07-14 RX ORDER — SODIUM CHLORIDE 9 MG/ML
INJECTION, SOLUTION INTRAVENOUS CONTINUOUS PRN
Status: DISCONTINUED | OUTPATIENT
Start: 2025-07-14 | End: 2025-07-14 | Stop reason: SURG

## 2025-07-14 RX ORDER — MIDAZOLAM HYDROCHLORIDE 1 MG/ML
0.5 INJECTION, SOLUTION INTRAMUSCULAR; INTRAVENOUS
Status: DISCONTINUED | OUTPATIENT
Start: 2025-07-14 | End: 2025-07-14 | Stop reason: HOSPADM

## 2025-07-14 RX ORDER — HEPARIN SODIUM 10000 [USP'U]/100ML
INJECTION, SOLUTION INTRAVENOUS
Status: DISCONTINUED | OUTPATIENT
Start: 2025-07-14 | End: 2025-07-14 | Stop reason: HOSPADM

## 2025-07-14 RX ORDER — PROPOFOL 10 MG/ML
VIAL (ML) INTRAVENOUS AS NEEDED
Status: DISCONTINUED | OUTPATIENT
Start: 2025-07-14 | End: 2025-07-14 | Stop reason: SURG

## 2025-07-14 RX ORDER — ONDANSETRON 2 MG/ML
INJECTION INTRAMUSCULAR; INTRAVENOUS AS NEEDED
Status: DISCONTINUED | OUTPATIENT
Start: 2025-07-14 | End: 2025-07-14 | Stop reason: SURG

## 2025-07-14 RX ORDER — ROCURONIUM BROMIDE 10 MG/ML
INJECTION, SOLUTION INTRAVENOUS AS NEEDED
Status: DISCONTINUED | OUTPATIENT
Start: 2025-07-14 | End: 2025-07-14 | Stop reason: SURG

## 2025-07-14 RX ORDER — SODIUM CHLORIDE, SODIUM LACTATE, POTASSIUM CHLORIDE, CALCIUM CHLORIDE 600; 310; 30; 20 MG/100ML; MG/100ML; MG/100ML; MG/100ML
9 INJECTION, SOLUTION INTRAVENOUS CONTINUOUS
Status: DISCONTINUED | OUTPATIENT
Start: 2025-07-14 | End: 2025-07-14 | Stop reason: HOSPADM

## 2025-07-14 RX ADMIN — ATROPINE SULFATE 0.2 MG: 0.4 INJECTION, SOLUTION INTRAVENOUS at 09:20

## 2025-07-14 RX ADMIN — ROCURONIUM BROMIDE 50 MG: 10 INJECTION INTRAVENOUS at 07:52

## 2025-07-14 RX ADMIN — DEXAMETHASONE SODIUM PHOSPHATE 4 MG: 4 INJECTION, SOLUTION INTRAMUSCULAR; INTRAVENOUS at 07:57

## 2025-07-14 RX ADMIN — FAMOTIDINE 20 MG: 20 TABLET, FILM COATED ORAL at 06:53

## 2025-07-14 RX ADMIN — ONDANSETRON 4 MG: 2 INJECTION INTRAMUSCULAR; INTRAVENOUS at 09:51

## 2025-07-14 RX ADMIN — SUGAMMADEX 200 MG: 100 INJECTION, SOLUTION INTRAVENOUS at 09:52

## 2025-07-14 RX ADMIN — LIDOCAINE HYDROCHLORIDE 50 MG: 10 INJECTION, SOLUTION INFILTRATION; PERINEURAL at 07:52

## 2025-07-14 RX ADMIN — SODIUM CHLORIDE: 9 INJECTION, SOLUTION INTRAVENOUS at 07:41

## 2025-07-14 RX ADMIN — PROPOFOL 120 MG: 10 INJECTION, EMULSION INTRAVENOUS at 07:52

## 2025-07-14 RX ADMIN — PHENYLEPHRINE HYDROCHLORIDE 0.3 MCG/KG/MIN: 10 INJECTION INTRAVENOUS at 08:05

## 2025-07-14 NOTE — ANESTHESIA POSTPROCEDURE EVALUATION
Patient: Luna Peck    Procedure Summary       Date: 07/14/25 Room / Location: [facility] CATH/EP LAB G / [facility] EP INVASIVE LOCATION    Anesthesia Start: 0741 Anesthesia Stop: 1004    Procedure: Ablation atrial fibrillation; PFA; no meds to hold Diagnosis:       Paroxysmal atrial fibrillation      (afib)    Providers: Keaton Camarillo MD Provider: Koko Dyson MD    Anesthesia Type: general ASA Status: 3            Anesthesia Type: general    Vitals  No vitals data found for the desired time range.          Post Anesthesia Care and Evaluation    Patient location during evaluation: bedside (cathlab recov)  Patient participation: complete - patient participated  Level of consciousness: awake and alert  Pain management: adequate    Airway patency: patent  Anesthetic complications: No anesthetic complications  PONV Status: none  Cardiovascular status: hemodynamically stable and acceptable  Respiratory status: nonlabored ventilation, acceptable and nasal cannula  Hydration status: acceptable    Comments: 140/70 55hr 99%  n/c 97.1 [id number]

## 2025-07-14 NOTE — Clinical Note
Hemostasis started on the right femoral vein. Vascade MVP and figure 8 suturing was used in achieving hemostasis. Closure device deployed in the vessel and manual pressure applied to vessel. Manual pressure was held by BB. Manual pressure was held for 10 min. Hemostasis achieved successfully.

## 2025-07-14 NOTE — ANESTHESIA PREPROCEDURE EVALUATION
Anesthesia Evaluation     Patient summary reviewed and Nursing notes reviewed   NPO Solid Status: > 8 hours  NPO Liquid Status: > 2 hours           Airway   Mallampati: II  TM distance: >3 FB  Neck ROM: full  No difficulty expected  Dental      Pulmonary     breath sounds clear to auscultation  Cardiovascular     ECG reviewed  Rhythm: irregular  Rate: normal    (+) hypertension, dysrhythmias Atrial Fib      Neuro/Psych  (+) headaches  GI/Hepatic/Renal/Endo    (+) GERD, thyroid problem hypothyroidism    Musculoskeletal     Abdominal    Substance History      OB/GYN          Other   arthritis,   history of cancer    ROS/Med Hx Other:  Left ventricular ejection fraction is normal.  ·  Myocardial perfusion imaging indicates a normal myocardial perfusion study with no evidence of ischemia.  ·  Impressions are consistent with a low risk study.  ·  TID 0.91.  ·  Findings consistent with a normal ECG stress test.                  Anesthesia Plan    ASA 3     general     intravenous induction     Anesthetic plan, risks, benefits, and alternatives have been provided, discussed and informed consent has been obtained with: patient.    Plan discussed with CRNA.    CODE STATUS:

## 2025-07-14 NOTE — H&P
Pre-cardiac Ablation History and Physical  [facility] at [facility]      Patient:  Luna Peck  :  1950  MRN: [id number]    PCP:  Tamara Ramirez APRN  PHONE:  [phone]    DATE: 2025  ID: Luna Peck is a 74 y.o. female from [facility]; palpitations, afib    Problem List:  Paroxysmal atrial fibrillation  Diagnosis?  TTE 2023: LVEF 67%, mild to moderate concentric LVH, normal diastolic function, RVSP <35, no significant VHD  14-day monitor 2023: 38/55/86 bpm, No atrial fibrillation  [facility] 2025 ER visit for A-fib with RVR  Sinus node dysfunction  Normal stress test 2023 no signs of ischemia  Hypertension  Hypothyroidism    BRIEF HPI:   Ms. Peck is a 73 y/o female with the above medical history who presents for PVA. She was first diagnosed with atrial fibrillation in  and has been on no antiarrhythmic medications since then.  She is fairly symptomatic with these episodes including palpitations, shortness of breath, weakness, dizziness and fatigue. She had a lot of symptomatic atrial fibrillation around last  but it got better up until she had an ER visit  with atrial fibrillation with RVR.  Her BMI is 27.  She rarely drinks EtOH.     Allergies:      No Known Allergies    MEDICATIONS:  Current Outpatient Medications   Medication Instructions    alendronate (FOSAMAX) 70 mg, Every 7 Days    bumetanide (BUMEX) 1 mg, Daily PRN    cetirizine (ZYRTEC) 10 mg, Nightly    Diclofenac Sodium (VOLTAREN) 4 g, 4 Times Daily PRN    docusate sodium (COLACE) 100 mg, 2 Times Daily PRN    Eliquis 5 mg, Oral, 2 Times Daily    fluticasone (FLONASE) 50 MCG/ACT nasal spray 2 sprays, Daily PRN    folic acid (FOLVITE) 1 mg, Daily    hydrALAZINE (APRESOLINE) 10 mg, 3 Times Daily    hydroCHLOROthiazide 25 mg, Oral, Daily    HYDROcodone-acetaminophen (NORCO) 7.5-325 MG per tablet 1 tablet, Daily PRN    levothyroxine (SYNTHROID, LEVOTHROID) 137  "mcg, Daily    losartan (COZAAR) 50 mg, Oral, Daily    metoprolol succinate XL (TOPROL-XL) 25 mg, As Needed    omeprazole (priLOSEC) 20 MG capsule Take 1 capsule by mouth 2 (Two) Times a Day.    potassium chloride 10 MEQ CR tablet 10 mEq, 2 Times Daily    vitamin D (ERGOCALCIFEROL) 50,000 Units, Weekly       Past medical & surgical history, social and family history reviewed in the electronic medical record.    ROS: Pertinent positives listed in the HPI and problem list above. All others reviewed and negative.     Physical Exam:   BP (!) 195/97 (BP Location: Left arm)   Pulse (!) 49   Temp 97.2 °F (36.2 °C) (Temporal)   Ht 167.6 cm (66\")   Wt 82.2 kg (181 lb 3.5 oz)   SpO2 97%   BMI 29.25 kg/m²     Constitutional:    Well-appearing 74 y.o. y/o adult  in no acute distress        Heart:    Regular rhythm and maria fernanda rate, no murmurs, rubs or gallops   Lungs:     Clear to auscultation bilaterally, no wheezes, rhales or rhonchi, nonlabored respirations       Extremities:   No gross deformities, no edema, clubbing, or cyanosis.    Pulses:    Neuro:  Psych:   Radial pulses palpable and equal bilaterally.  No gross focal deficits  Mood and behavior appropriate for situation         Labs and Diagnostic Data:  CT Angiogram Chest  Result Date: 7/8/2025  1. Cardiac enlargement without pericardial effusion. 2. No anomalous pulmonary venous return. 3. Left atrial appendage well-opacified without significant thrombus burden. 4. Esophagus traverses the chest posterior to the left atrium, with moderate to large hiatal hernia present. Electronically Signed: Duke Gongora MD  7/8/2025 2:45 PM EDT  Workstation ID: [id number]    Lab Results   Component Value Date    GLUCOSE 104 (H) 07/08/2025    BUN 21.1 07/08/2025    CREATININE 0.91 07/08/2025     07/08/2025    K 4.4 07/08/2025     (H) 07/08/2025    CALCIUM 9.7 07/08/2025    PROTEINTOT 7.8 04/21/2025    ALBUMIN 4.3 04/21/2025    ALT 15 04/21/2025    AST 21 04/21/2025    " ALKPHOS 68 04/21/2025    BILITOT 0.3 04/21/2025    GLOB 3.5 04/21/2025    AGRATIO 1.2 04/21/2025    BCR 23.2 07/08/2025    ANIONGAP 8.6 07/08/2025    EGFR 65.9 07/08/2025     Lab Results   Component Value Date    WBC 5.43 07/08/2025    HGB 11.8 (L) 07/08/2025    HCT 36.0 07/08/2025    MCV 82.9 07/08/2025     07/08/2025     Lab Results   Component Value Date    TSH 2.540 04/21/2025         Tele: sinus maria fernanda    IMPRESSION:  Ms. Peck is a 74-year-old female with a history of paroxysmal atrial fibrillation and sinus node dysfunction who presents for pulmonary vein ablation  No meds to hold    PLAN:  Procedure to perform: PVA. Risks, benefits and alternatives to the procedure explained to the patient and she understands and wishes to proceed.     Electronically signed by Farrukh Cheema PA-C, 07/14/25, 7:15 AM EDT.

## 2025-07-14 NOTE — ANESTHESIA PROCEDURE NOTES
Airway  Reason: elective    Date/Time: 7/14/2025 8:22 AM  Airway not difficult    General Information and Staff    Patient location during procedure: OR  CRNA/CAA: Junior GEORGIE Steele, CRNA    Indications and Patient Condition  Indications for airway management: airway protection    Preoxygenated: yes  MILS not maintained throughout    Mask difficulty assessment: 1 - vent by mask    Final Airway Details    Final airway type: endotracheal airway      Successful airway: ETT  Cuffed: yes   Successful intubation technique: direct laryngoscopy  Endotracheal tube insertion site: oral  Blade: Darcie  Blade size: 3  ETT size (mm): 7.0  Cormack-Lehane Classification: grade I - full view of glottis  Placement verified by: chest auscultation and capnometry   Measured from: lips  ETT/EBT  to lips (cm): 20  Number of attempts at approach: 1  Assessment: lips, teeth, and gum same as pre-op and atraumatic intubation    Additional Comments  Negative epigastric sounds, Breath sound equal bilaterally with symmetric chest rise and fall

## 2025-07-15 ENCOUNTER — CALL CENTER PROGRAMS (OUTPATIENT)
Dept: CALL CENTER | Facility: HOSPITAL | Age: 75
End: 2025-07-15
Payer: MEDICARE

## 2025-07-15 NOTE — OUTREACH NOTE
PCI/Device Survey      Flowsheet Row Responses   Facility patient discharged from? [facility]   Procedure date 07/14/25   Procedure (if device, specify in description) Ablation   Performing MD Dr. Keaton Camarillo   Attempt successful? Yes   Call start time 0936   Call end time 0939   Person spoke with today (if not patient) and relationship sister   Has the patient had any of the following symptoms since discharge? --  [No issues]   Is the patient taking prescribed medications: Apixaban   Nursing intervention Reminded to continue to take prescribed medications   Medication comments stop metoprolol   Does the patient have any of the following symptoms related to the cath/surgical site? --  [Right and Left groin site- dressing intact - no issues]   Does the patient have an appointment scheduled with the cardiologist? Yes   Appointment comments Cardio 8/14/25 1 pm. DR Camarillo 10/17/25   If the patient is a current smoker, are they able to teach back resources for cessation? Not a smoker   Did the patient feel prepared to go home on the same day as the procedure? Yes   Is the patient satisfied with the same day discharge process? Yes   PCI/Device call completed Yes   Wrap up additional comments Sister reports Pt doing well no issues.            LATOSHA GONZALEZ - Registered Nurse

## (undated) DEVICE — SYS CLS VASC/VENI VASCADE/MVP 10TO12F XL

## (undated) DEVICE — PRESSURE MONITORING SET: Brand: TRUWAVE

## (undated) DEVICE — 1 X VERSACROSS CONNECT TRANSSEPTAL DILATOR;  1 X VERSACROSS RF WIRE (INCLUDING 1 X CONNECTOR CABLE (SINGLE USE)): Brand: VERSACROSS CONNECT ACCESS SOLUTION FOR FARADRIVE

## (undated) DEVICE — PULSED FIELD ABLATION CATHETER: Brand: FARAWAVE™

## (undated) DEVICE — KT MANIFLD EP

## (undated) DEVICE — SYS CLS VASC/VENI VASCADE MVP 6TO12F

## (undated) DEVICE — ELECTRD RETRN/GRND MEGADYNE SGL/PLT W/CORD 9FT DISP

## (undated) DEVICE — Device: Brand: MEDEX

## (undated) DEVICE — LEX ELECTRO PHYSIOLOGY: Brand: MEDLINE INDUSTRIES, INC.

## (undated) DEVICE — ACUMEN IQ CUFF ADULT: Brand: ACUMEN IQ CUFF ADULT

## (undated) DEVICE — PINNACLE INTRODUCER SHEATH: Brand: PINNACLE

## (undated) DEVICE — ADULT, W/LG. BACK PAD, RADIOTRANSPARENT ELEMENT AND LEAD WIRE COMPATIBLE W/: Brand: DEFIBRILLATION ELECTRODES

## (undated) DEVICE — SET PRIMARY GRVTY 10DP MALE LL 104IN

## (undated) DEVICE — SOUNDSTAR CRYSTAL ULTRASOUND CATHETER: Brand: SOUNDSTAR CRYSTAL ULTRASOUND CATHETER

## (undated) DEVICE — ST INF PRI SMRTSTE 20DRP 2VLV 24ML 117

## (undated) DEVICE — TBG PRESS MON 48IN M/F L/L: Brand: MEDLINE INDUSTRIES, INC.

## (undated) DEVICE — NDL PERC 1PART ECHOTIP WO/BASEPLT 18G 7CM

## (undated) DEVICE — Device: Brand: REFERENCE PATCH CARTO 3

## (undated) DEVICE — OCTA,GALAXY,3-3-3-3-3,D-CURVE: Brand: OCTARAY MAPPING CATHETER

## (undated) DEVICE — DECANT BG O JET

## (undated) DEVICE — SOL NACL 0.9PCT 1000ML

## (undated) DEVICE — STEERABLE SHEATH CLEAR: Brand: FARADRIVE™

## (undated) DEVICE — DOME MONITORING W BONDED STPCK BIOTRANS2

## (undated) DEVICE — DRSNG SURESITE123 4X4.8IN

## (undated) DEVICE — Device: Brand: WEBSTER CS

## (undated) DEVICE — ST EXT IV SMARTSITE PINCH/CLMP 5ML 46CM

## (undated) DEVICE — ST EXT IV SMRTSTE 2VLV FIX M LL 6ML 41

## (undated) DEVICE — STERILE (15.2 TAPERED TO 7.6 X 183CM) POLYETHYLENE ACCORDION-FOLDED COVER FOR USE WITH SIEMENS ACUNAV ULTRASOUND CATHETER FAMILY CONNECTOR: Brand: SWIFTLINK TRANSDUCER COVER